# Patient Record
Sex: FEMALE | Race: WHITE | NOT HISPANIC OR LATINO | Employment: OTHER | URBAN - METROPOLITAN AREA
[De-identification: names, ages, dates, MRNs, and addresses within clinical notes are randomized per-mention and may not be internally consistent; named-entity substitution may affect disease eponyms.]

---

## 2017-01-17 ENCOUNTER — TELEPHONE (OUTPATIENT)
Dept: INTERNAL MEDICINE | Facility: CLINIC | Age: 72
End: 2017-01-17

## 2017-01-17 NOTE — TELEPHONE ENCOUNTER
Called pt to get copy of last dexa scan.  Stated her Belsomra is not working.  Sending message to Dr. Lind.

## 2017-01-18 ENCOUNTER — TELEPHONE (OUTPATIENT)
Dept: INTERNAL MEDICINE | Facility: CLINIC | Age: 72
End: 2017-01-18

## 2017-01-18 RX ORDER — ZOLPIDEM TARTRATE 10 MG/1
10 TABLET ORAL NIGHTLY PRN
Qty: 30 TABLET | Refills: 3 | Status: SHIPPED | OUTPATIENT
Start: 2017-01-18 | End: 2017-07-29 | Stop reason: SDUPTHER

## 2017-01-18 NOTE — TELEPHONE ENCOUNTER
Discontinue sleep bed.  She may use temazepam 15 mg by mouth daily at bedtime when necessary sleep

## 2017-01-18 NOTE — TELEPHONE ENCOUNTER
----- Message from Nika Chase MA sent at 1/17/2017  3:12 PM EST -----  Contact: Pt.  Patient stating that the Belsomra is not working.  It is causing her to have nightmares.  It awakens her in the middle of the night with heart palpatations.    Patient had some Zolpidem left and has been using them.    Please advise

## 2017-03-23 RX ORDER — LEVOTHYROXINE SODIUM 50 MCG
TABLET ORAL
Qty: 30 TABLET | Refills: 2 | Status: SHIPPED | OUTPATIENT
Start: 2017-03-23 | End: 2017-06-30 | Stop reason: SDUPTHER

## 2017-03-27 RX ORDER — ATORVASTATIN CALCIUM 40 MG/1
TABLET, FILM COATED ORAL
Qty: 90 TABLET | Refills: 0 | Status: SHIPPED | OUTPATIENT
Start: 2017-03-27 | End: 2017-07-03 | Stop reason: SDUPTHER

## 2017-06-29 RX ORDER — LEVOTHYROXINE SODIUM 50 MCG
TABLET ORAL
Qty: 30 TABLET | Refills: 0 | OUTPATIENT
Start: 2017-06-29

## 2017-06-30 ENCOUNTER — TELEPHONE (OUTPATIENT)
Dept: INTERNAL MEDICINE | Facility: CLINIC | Age: 72
End: 2017-06-30

## 2017-06-30 RX ORDER — LEVOTHYROXINE SODIUM 50 MCG
50 TABLET ORAL DAILY
Qty: 90 TABLET | Refills: 3 | Status: SHIPPED | OUTPATIENT
Start: 2017-06-30 | End: 2018-04-18

## 2017-06-30 RX ORDER — ATORVASTATIN CALCIUM 40 MG/1
40 TABLET, FILM COATED ORAL NIGHTLY
Qty: 90 TABLET | Refills: 0 | OUTPATIENT
Start: 2017-06-30

## 2017-06-30 NOTE — TELEPHONE ENCOUNTER
----- Message from Jami Tellez MA sent at 6/30/2017  9:42 AM EDT -----  Pt calling for refill #90 days    Synthroid 50g    [phone]

## 2017-07-03 RX ORDER — ATORVASTATIN CALCIUM 40 MG/1
TABLET, FILM COATED ORAL
Qty: 90 TABLET | Refills: 0 | Status: SHIPPED | OUTPATIENT
Start: 2017-07-03 | End: 2017-10-01 | Stop reason: SDUPTHER

## 2017-07-31 RX ORDER — ZOLPIDEM TARTRATE 10 MG/1
TABLET ORAL
Qty: 30 TABLET | Refills: 0 | Status: SHIPPED | OUTPATIENT
Start: 2017-07-31 | End: 2018-02-07 | Stop reason: SDUPTHER

## 2017-09-18 RX ORDER — DULOXETIN HYDROCHLORIDE 60 MG/1
CAPSULE, DELAYED RELEASE ORAL
Qty: 90 CAPSULE | Refills: 1 | Status: SHIPPED | OUTPATIENT
Start: 2017-09-18 | End: 2018-08-06 | Stop reason: SDUPTHER

## 2017-10-02 RX ORDER — ATORVASTATIN CALCIUM 40 MG/1
TABLET, FILM COATED ORAL
Qty: 90 TABLET | Refills: 3 | Status: SHIPPED | OUTPATIENT
Start: 2017-10-02 | End: 2018-08-27 | Stop reason: SDUPTHER

## 2017-10-05 ENCOUNTER — OFFICE VISIT (OUTPATIENT)
Dept: INTERNAL MEDICINE | Facility: CLINIC | Age: 72
End: 2017-10-05

## 2017-10-05 VITALS
WEIGHT: 135 LBS | DIASTOLIC BLOOD PRESSURE: 56 MMHG | BODY MASS INDEX: 23.92 KG/M2 | SYSTOLIC BLOOD PRESSURE: 110 MMHG | HEIGHT: 63 IN | HEART RATE: 68 BPM

## 2017-10-05 DIAGNOSIS — F41.8 MIXED ANXIETY DEPRESSIVE DISORDER: ICD-10-CM

## 2017-10-05 DIAGNOSIS — I10 BENIGN ESSENTIAL HYPERTENSION: Primary | ICD-10-CM

## 2017-10-05 DIAGNOSIS — E78.00 HYPERCHOLESTEROLEMIA: ICD-10-CM

## 2017-10-05 DIAGNOSIS — G43.009 MIGRAINE WITHOUT AURA AND WITHOUT STATUS MIGRAINOSUS, NOT INTRACTABLE: ICD-10-CM

## 2017-10-05 LAB
ALBUMIN SERPL-MCNC: 4.5 G/DL (ref 3.5–5.2)
ALBUMIN/GLOB SERPL: 1.5 G/DL
ALP SERPL-CCNC: 76 U/L (ref 39–117)
ALT SERPL W P-5'-P-CCNC: 25 U/L (ref 1–33)
ANION GAP SERPL CALCULATED.3IONS-SCNC: 9.7 MMOL/L
AST SERPL-CCNC: 23 U/L (ref 1–32)
BACTERIA UR QL AUTO: ABNORMAL /HPF
BASOPHILS # BLD AUTO: 0.01 10*3/MM3 (ref 0–0.2)
BASOPHILS NFR BLD AUTO: 0.2 % (ref 0–2)
BILIRUB SERPL-MCNC: 0.8 MG/DL (ref 0.1–1.2)
BILIRUB UR QL STRIP: NEGATIVE
BUN BLD-MCNC: 11 MG/DL (ref 8–23)
BUN/CREAT SERPL: 16.9 (ref 7–25)
CALCIUM SPEC-SCNC: 9.8 MG/DL (ref 8.6–10.5)
CHLORIDE SERPL-SCNC: 104 MMOL/L (ref 98–107)
CHOLEST SERPL-MCNC: 109 MG/DL (ref 0–200)
CLARITY UR: CLEAR
CO2 SERPL-SCNC: 27.3 MMOL/L (ref 22–29)
COLOR UR: YELLOW
CREAT BLD-MCNC: 0.65 MG/DL (ref 0.57–1)
DEPRECATED RDW RBC AUTO: 41.2 FL (ref 37–54)
EOSINOPHIL # BLD AUTO: 0.17 10*3/MM3 (ref 0–0.7)
EOSINOPHIL NFR BLD AUTO: 3.2 % (ref 0–5)
ERYTHROCYTE [DISTWIDTH] IN BLOOD BY AUTOMATED COUNT: 12.4 % (ref 11.5–15)
GFR SERPL CREATININE-BSD FRML MDRD: 90 ML/MIN/1.73
GLOBULIN UR ELPH-MCNC: 3.1 GM/DL
GLUCOSE BLD-MCNC: 101 MG/DL (ref 65–99)
GLUCOSE UR STRIP-MCNC: NEGATIVE MG/DL
HCT VFR BLD AUTO: 44.8 % (ref 34.1–44.9)
HDLC SERPL-MCNC: 39 MG/DL (ref 40–60)
HGB BLD-MCNC: 15.5 G/DL (ref 11.2–15.7)
HGB UR QL STRIP.AUTO: ABNORMAL
HYALINE CASTS UR QL AUTO: ABNORMAL /LPF
KETONES UR QL STRIP: NEGATIVE
LDLC SERPL CALC-MCNC: 48 MG/DL (ref 0–100)
LDLC/HDLC SERPL: 1.22 {RATIO}
LEUKOCYTE ESTERASE UR QL STRIP.AUTO: ABNORMAL
LYMPHOCYTES # BLD AUTO: 1.55 10*3/MM3 (ref 0.8–7)
LYMPHOCYTES NFR BLD AUTO: 29.2 % (ref 10–60)
MCH RBC QN AUTO: 32.2 PG (ref 26–34)
MCHC RBC AUTO-ENTMCNC: 34.6 G/DL (ref 31–37)
MCV RBC AUTO: 92.9 FL (ref 80–100)
MONOCYTES # BLD AUTO: 0.66 10*3/MM3 (ref 0–1)
MONOCYTES NFR BLD AUTO: 12.5 % (ref 0–13)
NEUTROPHILS # BLD AUTO: 2.91 10*3/MM3 (ref 1–11)
NEUTROPHILS NFR BLD AUTO: 54.9 % (ref 30–85)
NITRITE UR QL STRIP: NEGATIVE
PH UR STRIP.AUTO: 5.5 [PH] (ref 5–8)
PLATELET # BLD AUTO: 251 10*3/MM3 (ref 150–450)
PMV BLD AUTO: 10.1 FL (ref 6–12)
POTASSIUM BLD-SCNC: 4.5 MMOL/L (ref 3.5–5.2)
PROT SERPL-MCNC: 7.6 G/DL (ref 6–8.5)
PROT UR QL STRIP: NEGATIVE
RBC # BLD AUTO: 4.82 10*6/MM3 (ref 3.93–5.22)
RBC # UR: ABNORMAL /HPF
REF LAB TEST METHOD: ABNORMAL
SODIUM BLD-SCNC: 141 MMOL/L (ref 136–145)
SP GR UR STRIP: 1.01 (ref 1–1.03)
SQUAMOUS #/AREA URNS HPF: ABNORMAL /HPF
TRIGL SERPL-MCNC: 112 MG/DL (ref 0–150)
TSH SERPL DL<=0.05 MIU/L-ACNC: 0.01 MIU/ML (ref 0.27–4.2)
UROBILINOGEN UR QL STRIP: ABNORMAL
VLDLC SERPL-MCNC: 22.4 MG/DL (ref 5–40)
WBC NRBC COR # BLD: 5.3 10*3/MM3 (ref 5–10)
WBC UR QL AUTO: ABNORMAL /HPF

## 2017-10-05 PROCEDURE — 80061 LIPID PANEL: CPT | Performed by: INTERNAL MEDICINE

## 2017-10-05 PROCEDURE — 80053 COMPREHEN METABOLIC PANEL: CPT | Performed by: INTERNAL MEDICINE

## 2017-10-05 PROCEDURE — 84443 ASSAY THYROID STIM HORMONE: CPT | Performed by: INTERNAL MEDICINE

## 2017-10-05 PROCEDURE — 36415 COLL VENOUS BLD VENIPUNCTURE: CPT | Performed by: INTERNAL MEDICINE

## 2017-10-05 PROCEDURE — 81001 URINALYSIS AUTO W/SCOPE: CPT | Performed by: INTERNAL MEDICINE

## 2017-10-05 PROCEDURE — 99397 PER PM REEVAL EST PAT 65+ YR: CPT | Performed by: INTERNAL MEDICINE

## 2017-10-05 PROCEDURE — 85025 COMPLETE CBC W/AUTO DIFF WBC: CPT | Performed by: INTERNAL MEDICINE

## 2017-10-05 RX ORDER — THYROID 60 MG
TABLET ORAL
Refills: 0 | COMMUNITY
Start: 2017-07-05 | End: 2018-04-18

## 2017-10-05 NOTE — PROGRESS NOTES
Subjective   Naina Suarez is a 72 y.o. female.     History of Present Illness she is here today for her yearly examination which includes follow-up of chronic depression, hypercholesterolemia, osteopenia, and hypertension.  Due to a chronic sense of fatigue she was seen at a [facility] and begun on Synthroid and [place] thyroid as well as testosterone, estrogenic, and progesterone purportedly bioequivalent.  She discontinued the testosterone one month ago due to acneiform rash.  She relates being rivero as well as depressed and somewhat forgetful for quite some time.  This relates primarily to the separation she has undergone from her  on a mutual basis approximately 3 or 4 years ago.  There is still good friends and communicate with each other but no longer cohabitate.  She also is under chronic stress in that she cares for her 91-year-old mother who lives with her.  Although her mother's health is generally good at does add certain psychological concerns of course.  She has occasional headache but nothing severe or prolonged.  She denies any dizziness or focal neurologic symptoms.  She does not exercise regularly.  She denies any PND, SAUCEDA, chest pain, or swelling in the ankles.  In fact her review systems is otherwise negative.  She will be seeing her gynecologist soon and a mammogram will be scheduled.  She had a normal colonoscopy in 2014.        Review of Systems   Constitutional: Positive for fatigue. Negative for activity change and appetite change.   Respiratory: Negative for cough, chest tightness, shortness of breath and wheezing.    Cardiovascular: Positive for palpitations. Negative for chest pain and leg swelling.   Gastrointestinal: Negative for abdominal pain, anal bleeding, blood in stool, constipation, diarrhea, nausea and vomiting.   Genitourinary: Negative for difficulty urinating, dysuria, flank pain, frequency, hematuria and vaginal bleeding.   Musculoskeletal: Positive for back  pain. Negative for arthralgias and gait problem.   Neurological: Positive for headaches. Negative for dizziness, syncope, facial asymmetry, speech difficulty, weakness and numbness.   Psychiatric/Behavioral: Negative for dysphoric mood. The patient is nervous/anxious.        Objective   Physical Exam   Constitutional: She is oriented to person, place, and time. Vital signs are normal. She appears well-developed and well-nourished. She is active. She does not appear ill.   HENT:   Right Ear: Tympanic membrane, external ear and ear canal normal.   Left Ear: Tympanic membrane, external ear and ear canal normal.   Mouth/Throat: No oral lesions.   Eyes: Conjunctivae are normal.   Fundoscopic exam:       The right eye shows no AV nicking, no exudate and no hemorrhage.        The left eye shows no AV nicking, no exudate and no hemorrhage.   Neck: Carotid bruit is not present. No thyroid mass and no thyromegaly present.   Cardiovascular: Normal rate, regular rhythm, S1 normal and S2 normal.  Exam reveals no S3 and no S4.    No murmur heard.  Pulses:       Posterior tibial pulses are 2+ on the right side, and 2+ on the left side.   Pulmonary/Chest: No tachypnea. No respiratory distress. She has no decreased breath sounds. She has no wheezes. She has no rhonchi. She has no rales.   Abdominal: Soft. Normal appearance and bowel sounds are normal. She exhibits no abdominal bruit and no mass. There is no hepatosplenomegaly. There is no tenderness.   Musculoskeletal:        Arms:      Vascular Status -  Her exam exhibits no right foot edema. Her exam exhibits no left foot edema.  Lymphadenopathy:     She has no cervical adenopathy.   Neurological: She is alert and oriented to person, place, and time. She has normal strength. She displays no tremor. She displays a negative Romberg sign. Gait normal.   Reflex Scores:       Bicep reflexes are 2+ on the right side.  Psychiatric: She has a normal mood and affect. Her speech is normal  and behavior is normal. Judgment and thought content normal. Cognition and memory are normal.       Assessment/Plan DEXA scan last year was 0.0 at the LS-spine and -0.1 and -0.2 in the hips    Assessment #1 hypertension-good control #2 hypercholesterolemia-generally well-controlled and without sign of target organ injury #3 osteopenia-stable #4 chronic anxiety and depression-fairly well compensated and certainly her marital separation in the care of her mother are causative.  This was discussed with her also I did discuss with her the risk of using estrogen-containing on a long-term basis regarding breast cancer risk as well as cardiovascular disease and thromboembolic phenomena.    Plan #1 no change medication #2 I have recommended that she use her stationary bike for 30 minutes daily #3 mammogram to be scheduled by her gynecologist #4 CBC, CMP, urinalysis, TSH, lipids.  Routine follow-up with me in one year.

## 2017-10-09 ENCOUNTER — TELEPHONE (OUTPATIENT)
Dept: INTERNAL MEDICINE | Facility: CLINIC | Age: 72
End: 2017-10-09

## 2017-10-09 NOTE — TELEPHONE ENCOUNTER
----- Message from Jose Alfredo Lind Jr., MD sent at 10/6/2017  7:53 AM EDT -----  LDL cholesterol is very good at 48.  However thyroid is very high.  I would discontinue both armour thyroid and Synthroid with repeat TSH in 3 months.

## 2017-11-01 RX ORDER — NADOLOL 80 MG/1
TABLET ORAL
Qty: 90 TABLET | Refills: 3 | Status: SHIPPED | OUTPATIENT
Start: 2017-11-01 | End: 2018-10-17 | Stop reason: SDUPTHER

## 2018-02-07 RX ORDER — ZOLPIDEM TARTRATE 10 MG/1
TABLET ORAL
Qty: 30 TABLET | Refills: 0 | Status: SHIPPED | OUTPATIENT
Start: 2018-02-10 | End: 2018-05-14 | Stop reason: SDUPTHER

## 2018-04-02 RX ORDER — DULOXETIN HYDROCHLORIDE 60 MG/1
60 CAPSULE, DELAYED RELEASE ORAL DAILY
Qty: 90 CAPSULE | Refills: 0 | Status: SHIPPED | OUTPATIENT
Start: 2018-04-02 | End: 2018-04-18 | Stop reason: SDUPTHER

## 2018-04-18 ENCOUNTER — OFFICE VISIT (OUTPATIENT)
Dept: INTERNAL MEDICINE | Facility: CLINIC | Age: 73
End: 2018-04-18

## 2018-04-18 VITALS
WEIGHT: 141 LBS | SYSTOLIC BLOOD PRESSURE: 100 MMHG | OXYGEN SATURATION: 98 % | HEIGHT: 63 IN | DIASTOLIC BLOOD PRESSURE: 60 MMHG | BODY MASS INDEX: 24.98 KG/M2 | HEART RATE: 67 BPM

## 2018-04-18 DIAGNOSIS — E05.90 HYPERTHYROIDISM: Primary | ICD-10-CM

## 2018-04-18 LAB
T4 FREE SERPL-MCNC: 0.86 NG/DL (ref 0.93–1.7)
TSH SERPL-ACNC: 3.64 MIU/ML (ref 0.27–4.2)

## 2018-04-18 PROCEDURE — 99213 OFFICE O/P EST LOW 20 MIN: CPT | Performed by: INTERNAL MEDICINE

## 2018-04-18 RX ORDER — ANTIARTHRITIC COMBINATION NO.2 900 MG
TABLET ORAL DAILY
COMMUNITY

## 2018-04-18 NOTE — PROGRESS NOTES
Subjective   Naina Suarez is a 72 y.o. female.     History of Present Illness she is here today for follow-up of excessive thyroid hormone supplementation.  She was started on [place] 60 mg and Synthroid 0.05 mg by the [facility] during last summer.  When seen for routine yearly physical exam in October of this year she was found to have a very low TSH.  Both thyroid supplements were discontinued.  She has gained some weight of course since that time and her energy level she feels has diminished significantly.  She has chronic insomnia due to stress factors regarding the health of her mother who is in her 90s and actually doing well and the fact that she has been  from her  for the last 4 or 5 years.  She only gets 3-5 hours of sleep per night.        Review of Systems   Constitutional: Positive for fatigue and unexpected weight gain. Negative for activity change and appetite change.   Respiratory: Negative for chest tightness and shortness of breath.    Gastrointestinal: Negative for abdominal pain and constipation.   Neurological: Negative for dizziness, weakness and headaches.   Psychiatric/Behavioral: The patient is nervous/anxious.        Objective   Physical Exam   Constitutional: She is oriented to person, place, and time. She appears well-developed and well-nourished. She is active. She does not appear ill.   Neck: No thyroid mass and no thyromegaly present.   Cardiovascular: Normal rate, regular rhythm, S1 normal and S2 normal.  Exam reveals no S3 and no S4.    No murmur heard.  Pulmonary/Chest: No tachypnea. No respiratory distress. She has no decreased breath sounds. She has no wheezes. She has no rhonchi. She has no rales.   Abdominal: Soft. Normal appearance and bowel sounds are normal. She exhibits no abdominal bruit and no mass. There is no hepatosplenomegaly. There is no tenderness.     Vascular Status -  Her right foot exhibits no edema. Her left foot exhibits no  edema.  Neurological: She is alert and oriented to person, place, and time. Gait normal.   Reflex Scores:       Bicep reflexes are 2+ on the right side.  Psychiatric: She has a normal mood and affect. Her speech is normal and behavior is normal. Judgment and thought content normal. Cognition and memory are normal.         Assessment/Plan October lab showed a normal CBC, CMP.  Total cholesterol 109 triglycerides 112 HDL cholesterol 39 LDL cholesterol 48.  TSH 0.006    Assessment #1 hyperthyroidism-secondary to inappropriate thyroid supplementation which has been discontinued.  Again this was discussed with her.    Plan #1 TSH, free T3, free T4 today.  Routine follow-up with me in 3 months.

## 2018-04-19 ENCOUNTER — TELEPHONE (OUTPATIENT)
Dept: INTERNAL MEDICINE | Facility: CLINIC | Age: 73
End: 2018-04-19

## 2018-04-19 LAB — T3FREE SERPL-MCNC: 3.1 PG/ML (ref 2–4.4)

## 2018-04-19 RX ORDER — LEVOTHYROXINE SODIUM 0.05 MG/1
50 TABLET ORAL DAILY
Qty: 90 TABLET | Refills: 3 | Status: SHIPPED | OUTPATIENT
Start: 2018-04-19 | End: 2018-10-17 | Stop reason: SDUPTHER

## 2018-04-19 NOTE — TELEPHONE ENCOUNTER
----- Message from Jose Alfredo Lind Jr., MD sent at 4/19/2018  9:08 AM EDT -----  Begin Synthroid 0.05 mg 1 by mouth daily.  Follow-up with me in 3 months.

## 2018-05-11 RX ORDER — ZOLPIDEM TARTRATE 10 MG/1
TABLET ORAL
Qty: 30 TABLET | Refills: 0 | Status: CANCELLED | OUTPATIENT
Start: 2018-05-11

## 2018-05-14 RX ORDER — ZOLPIDEM TARTRATE 10 MG/1
10 TABLET ORAL NIGHTLY PRN
Qty: 30 TABLET | Refills: 0 | Status: SHIPPED | OUTPATIENT
Start: 2018-05-14 | End: 2018-10-17 | Stop reason: SDUPTHER

## 2018-05-14 NOTE — TELEPHONE ENCOUNTER
----- Message from Azalia Brown sent at 5/11/2018  2:32 PM EDT -----  Contact: Patient  Patient requesting refill on    zolpidem (AMBIEN) 10 MG tablet    Patient:[phone]    Pharmacy:[facility] [address] AT [facility] - [phone] [facility] [phone]

## 2018-05-14 NOTE — TELEPHONE ENCOUNTER
Called into patients [facility] pharmacy quantity of 30 with 5 refills per Dr Lind.

## 2018-06-28 RX ORDER — DULOXETIN HYDROCHLORIDE 60 MG/1
60 CAPSULE, DELAYED RELEASE ORAL DAILY
Qty: 90 CAPSULE | Refills: 0 | Status: SHIPPED | OUTPATIENT
Start: 2018-06-28 | End: 2018-10-17 | Stop reason: SDUPTHER

## 2018-08-06 ENCOUNTER — OFFICE VISIT (OUTPATIENT)
Dept: INTERNAL MEDICINE | Facility: CLINIC | Age: 73
End: 2018-08-06

## 2018-08-06 VITALS
SYSTOLIC BLOOD PRESSURE: 118 MMHG | WEIGHT: 139 LBS | BODY MASS INDEX: 24.63 KG/M2 | DIASTOLIC BLOOD PRESSURE: 84 MMHG | HEIGHT: 63 IN

## 2018-08-06 DIAGNOSIS — F41.8 MIXED ANXIETY DEPRESSIVE DISORDER: ICD-10-CM

## 2018-08-06 DIAGNOSIS — I10 BENIGN ESSENTIAL HYPERTENSION: ICD-10-CM

## 2018-08-06 DIAGNOSIS — E03.9 ACQUIRED HYPOTHYROIDISM: Primary | ICD-10-CM

## 2018-08-06 LAB — T4 FREE SERPL-MCNC: 1.11 NG/DL (ref 0.93–1.7)

## 2018-08-06 PROCEDURE — 99213 OFFICE O/P EST LOW 20 MIN: CPT | Performed by: INTERNAL MEDICINE

## 2018-08-06 NOTE — PROGRESS NOTES
Subjective   Naina Suarez is a 73 y.o. female.     History of Present Illness she is here today for follow-up of hypothyroidism.  She continues to have fatigue despite thyroid supplementation.  Her sleep is broken and she only gets to bed at 3 or 4 in the morning because she is spending time with her mother at her mother's residence prior to returning home.  She gets perhaps 4 or 5 hours sleep and then her mother call surgery date each morning.  She may be able to get back to sleep for 3 or 4 hours after that.        Review of Systems   Constitutional: Positive for fatigue. Negative for activity change, appetite change and unexpected weight gain.   HENT: Negative for trouble swallowing.    Respiratory: Negative for shortness of breath.    Cardiovascular: Negative for chest pain and leg swelling.   Gastrointestinal: Negative for abdominal pain and constipation.   Neurological: Negative for dizziness, weakness and headache.       Objective   Physical Exam   Constitutional: She is oriented to person, place, and time. Vital signs are normal. She appears well-developed and well-nourished. She is active. She does not appear ill.   Eyes: Conjunctivae are normal.   Neck: No thyromegaly present.   Cardiovascular: Normal rate, regular rhythm, S1 normal and S2 normal.  Exam reveals no S3 and no S4.    No murmur heard.  Pulmonary/Chest: No tachypnea. No respiratory distress. She has no decreased breath sounds. She has no wheezes. She has no rhonchi. She has no rales.   Abdominal: Soft. Normal appearance and bowel sounds are normal. She exhibits no abdominal bruit and no mass. There is no hepatosplenomegaly. There is no tenderness.     Vascular Status -  Her right foot exhibits no edema. Her left foot exhibits no edema.  Neurological: She is alert and oriented to person, place, and time. Gait normal.   Reflex Scores:       Bicep reflexes are 2+ on the right side.  Psychiatric: She has a normal mood and affect. Her speech is  normal and behavior is normal. Judgment and thought content normal. Cognition and memory are normal.         Assessment/Plan April lab showed normal free T3 and TSH.  Free T4 was 0.86    Assessment #1 hypothyroidism-should be euthyroid at this point.  Fatigue likely due to interrupted sleep and ongoing stress in caring for her mother.  This was discussed with her.    Plan #1 no change medication #2 free T4 today.  Routine follow-up in 6 months.

## 2018-08-28 RX ORDER — ATORVASTATIN CALCIUM 40 MG/1
TABLET, FILM COATED ORAL
Qty: 90 TABLET | Refills: 0 | Status: SHIPPED | OUTPATIENT
Start: 2018-08-28 | End: 2018-10-17 | Stop reason: SDUPTHER

## 2018-10-17 RX ORDER — LEVOTHYROXINE SODIUM 0.05 MG/1
50 TABLET ORAL DAILY
Qty: 30 TABLET | Refills: 0 | Status: SHIPPED | OUTPATIENT
Start: 2018-10-17 | End: 2018-10-17 | Stop reason: SDUPTHER

## 2018-10-17 RX ORDER — DULOXETIN HYDROCHLORIDE 60 MG/1
60 CAPSULE, DELAYED RELEASE ORAL DAILY
Qty: 90 CAPSULE | Refills: 0 | Status: SHIPPED | OUTPATIENT
Start: 2018-10-17 | End: 2019-01-25 | Stop reason: SDUPTHER

## 2018-10-17 RX ORDER — LEVOTHYROXINE SODIUM 0.05 MG/1
50 TABLET ORAL DAILY
Qty: 90 TABLET | Refills: 0 | Status: SHIPPED | OUTPATIENT
Start: 2018-10-17 | End: 2019-01-25 | Stop reason: SDUPTHER

## 2018-10-17 RX ORDER — ATORVASTATIN CALCIUM 40 MG/1
40 TABLET, FILM COATED ORAL
Qty: 90 TABLET | Refills: 0 | Status: SHIPPED | OUTPATIENT
Start: 2018-10-17 | End: 2018-12-05 | Stop reason: SDUPTHER

## 2018-10-17 RX ORDER — NADOLOL 80 MG/1
80 TABLET ORAL DAILY
Qty: 30 TABLET | Refills: 0 | Status: SHIPPED | OUTPATIENT
Start: 2018-10-17 | End: 2018-10-17 | Stop reason: SDUPTHER

## 2018-10-17 RX ORDER — NADOLOL 80 MG/1
80 TABLET ORAL DAILY
Qty: 90 TABLET | Refills: 0 | Status: SHIPPED | OUTPATIENT
Start: 2018-10-17 | End: 2019-01-25 | Stop reason: SDUPTHER

## 2018-10-17 RX ORDER — ZOLPIDEM TARTRATE 10 MG/1
10 TABLET ORAL NIGHTLY PRN
Qty: 90 TABLET | Refills: 0 | OUTPATIENT
Start: 2018-10-17 | End: 2019-01-25 | Stop reason: SDUPTHER

## 2018-10-17 RX ORDER — ATORVASTATIN CALCIUM 40 MG/1
40 TABLET, FILM COATED ORAL
Qty: 30 TABLET | Refills: 0 | Status: SHIPPED | OUTPATIENT
Start: 2018-10-17 | End: 2018-10-17 | Stop reason: SDUPTHER

## 2018-10-17 NOTE — TELEPHONE ENCOUNTER
----- Message from Adriane Gomezjorge sent at 10/17/2018 12:49 PM EDT -----  Contact: Pt-Valdo juarez MD.  Pt is calling for a refill     FOR  zolpidem (AMBIEN) 10 MG tablet  atorvastatin (LIPITOR) 40 MG tablet  levothyroxine (SYNTHROID) 50 MCG tablet  nadolol (CORGARD) 80 MG tablet    Patient requests RX SENT TO    [facility] [id number] [place] [address] AT [facility] - [phone] [place] [phone] FX      Caller# [phone]     Please and thank you.

## 2018-12-05 RX ORDER — ATORVASTATIN CALCIUM 40 MG/1
40 TABLET, FILM COATED ORAL
Qty: 30 TABLET | Refills: 0 | Status: SHIPPED | OUTPATIENT
Start: 2018-12-05 | End: 2019-01-25 | Stop reason: SDUPTHER

## 2019-01-25 ENCOUNTER — OFFICE VISIT (OUTPATIENT)
Dept: FAMILY MEDICINE CLINIC | Facility: CLINIC | Age: 74
End: 2019-01-25

## 2019-01-25 VITALS
SYSTOLIC BLOOD PRESSURE: 116 MMHG | HEART RATE: 64 BPM | RESPIRATION RATE: 13 BRPM | WEIGHT: 120 LBS | HEIGHT: 62 IN | DIASTOLIC BLOOD PRESSURE: 84 MMHG | BODY MASS INDEX: 22.08 KG/M2 | OXYGEN SATURATION: 98 %

## 2019-01-25 DIAGNOSIS — G47.00 INSOMNIA, UNSPECIFIED TYPE: ICD-10-CM

## 2019-01-25 DIAGNOSIS — F41.9 ANXIETY: Primary | ICD-10-CM

## 2019-01-25 DIAGNOSIS — M47.816 OSTEOARTHRITIS OF LUMBAR SPINE, UNSPECIFIED SPINAL OSTEOARTHRITIS COMPLICATION STATUS: ICD-10-CM

## 2019-01-25 DIAGNOSIS — E78.5 HYPERLIPIDEMIA, UNSPECIFIED HYPERLIPIDEMIA TYPE: ICD-10-CM

## 2019-01-25 DIAGNOSIS — E03.9 HYPOTHYROIDISM, UNSPECIFIED TYPE: ICD-10-CM

## 2019-01-25 DIAGNOSIS — R00.2 PALPITATIONS: ICD-10-CM

## 2019-01-25 DIAGNOSIS — I10 ESSENTIAL HYPERTENSION: ICD-10-CM

## 2019-01-25 DIAGNOSIS — Z86.79 HISTORY OF VALVULAR HEART DISEASE: ICD-10-CM

## 2019-01-25 PROCEDURE — 99204 OFFICE O/P NEW MOD 45 MIN: CPT | Performed by: FAMILY MEDICINE

## 2019-01-25 RX ORDER — ZOLPIDEM TARTRATE 10 MG/1
10 TABLET ORAL NIGHTLY PRN
Qty: 30 TABLET | Refills: 0 | Status: SHIPPED | OUTPATIENT
Start: 2019-01-25 | End: 2019-06-09 | Stop reason: SDUPTHER

## 2019-01-25 RX ORDER — DULOXETIN HYDROCHLORIDE 60 MG/1
60 CAPSULE, DELAYED RELEASE ORAL DAILY
Qty: 90 CAPSULE | Refills: 1 | Status: SHIPPED | OUTPATIENT
Start: 2019-01-25 | End: 2019-08-22 | Stop reason: SDUPTHER

## 2019-01-25 RX ORDER — ATORVASTATIN CALCIUM 40 MG/1
40 TABLET, FILM COATED ORAL
Qty: 90 TABLET | Refills: 1 | Status: SHIPPED | OUTPATIENT
Start: 2019-01-25 | End: 2021-12-13

## 2019-01-25 RX ORDER — LEVOTHYROXINE SODIUM 0.05 MG/1
50 TABLET ORAL DAILY
Qty: 90 TABLET | Refills: 1 | Status: SHIPPED | OUTPATIENT
Start: 2019-01-25

## 2019-01-25 RX ORDER — NADOLOL 80 MG/1
80 TABLET ORAL DAILY
Qty: 90 TABLET | Refills: 1 | Status: SHIPPED | OUTPATIENT
Start: 2019-01-25 | End: 2021-12-13

## 2019-01-25 RX ORDER — BUSPIRONE HYDROCHLORIDE 5 MG/1
5 TABLET ORAL 3 TIMES DAILY PRN
Qty: 90 TABLET | Refills: 3 | Status: SHIPPED | OUTPATIENT
Start: 2019-01-25 | End: 2019-02-20 | Stop reason: HOSPADM

## 2019-01-25 NOTE — PROGRESS NOTES
Subjective   Naina Suarez is a 73 y.o. female.     Chief Complaint   Patient presents with   • Insomnia   • Hypertension   • Establish Care       HPI     Insomnia:  -taking zolpidem 5-10 mg QHS and she is tolerating it well  -no next day grogginess  -no sleep walking or sleep talking  -she is experiencing caregiver stress: her elderly mother is in hospice care for CHF and CKD   -she is taking duloxetine 60 mg daily (initially prescribed for back pain due to DJD) and xanax prn panic attacks left over from 9 yr ago  - from her  x 9 yr  -significant stress in her relationship with her boyfriend of 3 yr  -no SI, HI, or self harm    HTN:  -taking nadolol 80 mg daily  -no chest pain, dizziness, headaches, or acute vision changes  -intermittent palpitations, which pt has attributed to anxiety  -no syncope  -pt also reports a hx of valvular heart disease    Thyroid disease:  -taking levothyroxine 50 mcg daily    Hyperlipidemia:  -taking atorvastatin 40 mg daily    Previous PCP, Dr. Lind, has retired         Review of Systems   Constitutional: Positive for unexpected weight change (20 lb weight loss in the last year). Negative for fever.   HENT: Negative for congestion and sore throat.    Eyes: Negative for pain and visual disturbance.   Respiratory: Negative for cough and shortness of breath.    Cardiovascular: Positive for palpitations. Negative for chest pain.   Gastrointestinal: Negative for abdominal pain and nausea.   Genitourinary: Negative for dysuria and hematuria.   Musculoskeletal: Negative for arthralgias and gait problem.   Skin: Negative for rash and wound.   Allergic/Immunologic: Negative for food allergies and immunocompromised state.   Neurological: Negative for dizziness, syncope and headaches.   Psychiatric/Behavioral: Positive for dysphoric mood and sleep disturbance. Negative for self-injury and suicidal ideas. The patient is nervous/anxious.        The following portions of the  patient's history were reviewed and updated as appropriate: allergies, current medications, past family history, past medical history, past social history, past surgical history and problem list.    Past Medical History:   Diagnosis Date   • Caregiver stress    • Degenerative joint disease (DJD) of lumbar spine    • Degenerative joint disease (DJD) of lumbar spine    • Hyperlipidemia    • Hypertension    • Hyperthyroidism    • Insomnia    • Migraine    • Osteopenia    • Valvular heart disease      Past Surgical History:   Procedure Laterality Date   • CATARACT EXTRACTION Right    • TOE SURGERY     • WISDOM TOOTH EXTRACTION       Family History   Problem Relation Age of Onset   • Heart failure Mother    • Cataracts Mother    • Heart attack Father    • Lung disease Father    • Retinal detachment Sister    • Anxiety disorder Sister    • Bipolar disorder Sister    • Obesity Sister    • Fibromyalgia Sister    • Heart murmur Sister      Social History     Tobacco Use   • Smoking status: Never Smoker   • Smokeless tobacco: Never Used   Substance and Sexual Activity   • Alcohol use: Yes     Comment: Rarely   • Drug use: No     Allergies   Allergen Reactions   • Sulfa Antibiotics Hives and Itching        Outpatient Medications Prior to Visit   Medication Sig Dispense Refill   • Biotin 5000 MCG tablet Take  by mouth Daily.     • Calcium Carbonate-Vit D-Min (CALCIUM 1200 PO) Take  by mouth Daily.     • Cholecalciferol (VITAMIN D3) 5000 units capsule capsule Take 5,000 Units by mouth Daily.     • Cyanocobalamin (B-12 PO) Take  by mouth Daily.     • Multiple Vitamins-Minerals (WOMENS MULTIVITAMIN PO) Take  by mouth Daily.     • atorvastatin (LIPITOR) 40 MG tablet Take 1 tablet by mouth every night at bedtime. 30 tablet 0   • DULoxetine (CYMBALTA) 60 MG capsule Take 1 capsule by mouth Daily. 90 capsule 0   • levothyroxine (SYNTHROID) 50 MCG tablet Take 1 tablet by mouth Daily. 90 tablet 0   • nadolol (CORGARD) 80 MG tablet Take 1  tablet by mouth Daily. 90 tablet 0   • zolpidem (AMBIEN) 10 MG tablet Take 1 tablet by mouth At Night As Needed (When necessary). 90 tablet 0     Objective     Vitals:    01/25/19 1535   BP: 116/84   Pulse: 64   Resp: 13   SpO2: 98%       Physical Exam   Constitutional: She appears well-developed and well-nourished. No distress.   HENT:   Head: Normocephalic and atraumatic.   Neck: No thyromegaly present.   Cardiovascular: Normal rate and regular rhythm. Exam reveals no gallop and no friction rub.   Murmur heard.   Systolic murmur is present with a grade of 3/6.  Pulses:       Radial pulses are 2+ on the right side, and 2+ on the left side.   Pulmonary/Chest: Effort normal and breath sounds normal. No respiratory distress. She has no wheezes. She has no rhonchi. She has no rales.   Abdominal: Soft. Bowel sounds are normal. She exhibits no distension. There is no tenderness.   Lymphadenopathy:     She has no cervical adenopathy.   Skin: Skin is warm and dry.   Psychiatric: Her speech is normal. Her mood appears anxious. She exhibits a depressed mood.   tearful       ASSESSMENT/PLAN       Problem List Items Addressed This Visit        Musculoskeletal and Integument    Degenerative joint disease (DJD) of lumbar spine    Relevant Medications    Refill DULoxetine (CYMBALTA) 60 MG capsule       Other    Insomnia    Relevant Medications    Refill zolpidem (AMBIEN) 10 MG tablet  MANDEEP reviewed and consistent with reported history.   Controlled substance contract completed and signed.         Other Visit Diagnoses     Anxiety    -  Primary    Relevant Medications    Start busPIRone (BUSPAR) 5 MG tablet TID prn; pt cautioned about risk for serotonin syndrome    Continue DULoxetine (CYMBALTA) 60 MG capsule    Other Relevant Orders    Ambulatory Referral to Behavioral Health    Essential hypertension        Relevant Medications    Refill nadolol (CORGARD) 80 MG tablet    Hyperlipidemia, unspecified hyperlipidemia type         Relevant Medications    Refill atorvastatin (LIPITOR) 40 MG tablet    Hypothyroidism, unspecified type        Relevant Medications        Refill levothyroxine (SYNTHROID) 50 MCG tablet    Palpitations    & mumur in a pt with a History of valvular heart disease  But NO chest pain, SOA, or syncope      Relevant Orders    Ambulatory Referral to Cardiology              Patient Instructions   Please schedule fasting labs within the next 2 months    Return in about 2 months (around 3/25/2019).      Roseyln Cantrell MD  01/25/19

## 2019-01-29 DIAGNOSIS — E03.9 HYPOTHYROIDISM, UNSPECIFIED TYPE: Primary | ICD-10-CM

## 2019-01-29 DIAGNOSIS — Z13.1 SCREENING FOR DIABETES MELLITUS: ICD-10-CM

## 2019-01-29 DIAGNOSIS — E78.5 HYPERLIPIDEMIA, UNSPECIFIED HYPERLIPIDEMIA TYPE: ICD-10-CM

## 2019-01-29 DIAGNOSIS — R53.83 FATIGUE, UNSPECIFIED TYPE: ICD-10-CM

## 2019-01-30 ENCOUNTER — RESULTS ENCOUNTER (OUTPATIENT)
Dept: FAMILY MEDICINE CLINIC | Facility: CLINIC | Age: 74
End: 2019-01-30

## 2019-01-30 DIAGNOSIS — Z13.1 SCREENING FOR DIABETES MELLITUS: ICD-10-CM

## 2019-01-30 DIAGNOSIS — E03.9 HYPOTHYROIDISM, UNSPECIFIED TYPE: ICD-10-CM

## 2019-01-30 DIAGNOSIS — E78.5 HYPERLIPIDEMIA, UNSPECIFIED HYPERLIPIDEMIA TYPE: ICD-10-CM

## 2019-01-30 DIAGNOSIS — R53.83 FATIGUE, UNSPECIFIED TYPE: ICD-10-CM

## 2019-02-20 ENCOUNTER — OFFICE VISIT (OUTPATIENT)
Dept: FAMILY MEDICINE CLINIC | Facility: CLINIC | Age: 74
End: 2019-02-20

## 2019-02-20 ENCOUNTER — HOSPITAL ENCOUNTER (EMERGENCY)
Facility: HOSPITAL | Age: 74
Discharge: HOME | End: 2019-02-20
Attending: EMERGENCY MEDICINE | Admitting: EMERGENCY MEDICINE

## 2019-02-20 VITALS
WEIGHT: 119.4 LBS | RESPIRATION RATE: 18 BRPM | OXYGEN SATURATION: 98 % | SYSTOLIC BLOOD PRESSURE: 116 MMHG | DIASTOLIC BLOOD PRESSURE: 71 MMHG | TEMPERATURE: 97.7 F | HEIGHT: 64 IN | BODY MASS INDEX: 20.38 KG/M2 | HEART RATE: 85 BPM

## 2019-02-20 VITALS
OXYGEN SATURATION: 98 % | WEIGHT: 114 LBS | HEART RATE: 108 BPM | SYSTOLIC BLOOD PRESSURE: 128 MMHG | DIASTOLIC BLOOD PRESSURE: 88 MMHG | HEIGHT: 62 IN | BODY MASS INDEX: 20.98 KG/M2

## 2019-02-20 DIAGNOSIS — F32.A DEPRESSION, UNSPECIFIED DEPRESSION TYPE: Primary | ICD-10-CM

## 2019-02-20 DIAGNOSIS — E87.6 LOW SERUM POTASSIUM: ICD-10-CM

## 2019-02-20 DIAGNOSIS — E86.0 DEHYDRATION: ICD-10-CM

## 2019-02-20 LAB
ALBUMIN SERPL-MCNC: 4.1 G/DL (ref 3.5–5.2)
ALBUMIN/GLOB SERPL: 1.3 G/DL
ALP SERPL-CCNC: 73 U/L (ref 39–117)
ALT SERPL W P-5'-P-CCNC: 25 U/L (ref 1–33)
AMPHET+METHAMPHET UR QL: NEGATIVE
ANION GAP SERPL CALCULATED.3IONS-SCNC: 13.4 MMOL/L
AST SERPL-CCNC: 30 U/L (ref 1–32)
BACTERIA UR QL AUTO: NORMAL /HPF
BARBITURATES UR QL SCN: NEGATIVE
BASOPHILS # BLD AUTO: 0.04 10*3/MM3 (ref 0–0.2)
BASOPHILS NFR BLD AUTO: 0.9 % (ref 0–1.5)
BENZODIAZ UR QL SCN: NEGATIVE
BILIRUB SERPL-MCNC: 0.9 MG/DL (ref 0.1–1.2)
BILIRUB UR QL STRIP: NEGATIVE
BUN BLD-MCNC: 9 MG/DL (ref 8–23)
BUN/CREAT SERPL: 14.1 (ref 7–25)
CALCIUM SPEC-SCNC: 10 MG/DL (ref 8.6–10.5)
CANNABINOIDS SERPL QL: NEGATIVE
CHLORIDE SERPL-SCNC: 104 MMOL/L (ref 98–107)
CLARITY UR: CLEAR
CO2 SERPL-SCNC: 24.6 MMOL/L (ref 22–29)
COCAINE UR QL: NEGATIVE
COLOR UR: YELLOW
CREAT BLD-MCNC: 0.64 MG/DL (ref 0.57–1)
DEPRECATED RDW RBC AUTO: 44.8 FL (ref 37–54)
EOSINOPHIL # BLD AUTO: 0.09 10*3/MM3 (ref 0–0.4)
EOSINOPHIL NFR BLD AUTO: 1.9 % (ref 0.3–6.2)
ERYTHROCYTE [DISTWIDTH] IN BLOOD BY AUTOMATED COUNT: 12.3 % (ref 12.3–15.4)
ETHANOL BLD-MCNC: <10 MG/DL (ref 0–10)
ETHANOL UR QL: <0.01 %
GFR SERPL CREATININE-BSD FRML MDRD: 91 ML/MIN/1.73
GLOBULIN UR ELPH-MCNC: 3.1 GM/DL
GLUCOSE BLD-MCNC: 143 MG/DL (ref 65–99)
GLUCOSE UR STRIP-MCNC: NEGATIVE MG/DL
HCT VFR BLD AUTO: 43.4 % (ref 34–46.6)
HGB BLD-MCNC: 14.6 G/DL (ref 12–15.9)
HGB UR QL STRIP.AUTO: NEGATIVE
HOLD SPECIMEN: NORMAL
HOLD SPECIMEN: NORMAL
HYALINE CASTS UR QL AUTO: NORMAL /LPF
IMM GRANULOCYTES # BLD AUTO: 0.01 10*3/MM3 (ref 0–0.05)
IMM GRANULOCYTES NFR BLD AUTO: 0.2 % (ref 0–0.5)
KETONES UR QL STRIP: NEGATIVE
LEUKOCYTE ESTERASE UR QL STRIP.AUTO: ABNORMAL
LIPASE SERPL-CCNC: 38 U/L (ref 13–60)
LYMPHOCYTES # BLD AUTO: 1.56 10*3/MM3 (ref 0.7–3.1)
LYMPHOCYTES NFR BLD AUTO: 33.7 % (ref 19.6–45.3)
MCH RBC QN AUTO: 33 PG (ref 26.6–33)
MCHC RBC AUTO-ENTMCNC: 33.6 G/DL (ref 31.5–35.7)
MCV RBC AUTO: 98.2 FL (ref 79–97)
METHADONE UR QL SCN: NEGATIVE
MONOCYTES # BLD AUTO: 0.37 10*3/MM3 (ref 0.1–0.9)
MONOCYTES NFR BLD AUTO: 8 % (ref 5–12)
NEUTROPHILS # BLD AUTO: 2.56 10*3/MM3 (ref 1.4–7)
NEUTROPHILS NFR BLD AUTO: 55.3 % (ref 42.7–76)
NITRITE UR QL STRIP: NEGATIVE
NRBC BLD AUTO-RTO: 0 /100 WBC (ref 0–0)
OPIATES UR QL: NEGATIVE
OXYCODONE UR QL SCN: NEGATIVE
PH UR STRIP.AUTO: 6.5 [PH] (ref 5–8)
PLATELET # BLD AUTO: 238 10*3/MM3 (ref 140–450)
PMV BLD AUTO: 9.9 FL (ref 6–12)
POTASSIUM BLD-SCNC: 3.2 MMOL/L (ref 3.5–5.2)
PROT SERPL-MCNC: 7.2 G/DL (ref 6–8.5)
PROT UR QL STRIP: NEGATIVE
RBC # BLD AUTO: 4.42 10*6/MM3 (ref 3.77–5.28)
RBC # UR: NORMAL /HPF
REF LAB TEST METHOD: NORMAL
SODIUM BLD-SCNC: 142 MMOL/L (ref 136–145)
SP GR UR STRIP: 1.02 (ref 1–1.03)
SQUAMOUS #/AREA URNS HPF: NORMAL /HPF
UROBILINOGEN UR QL STRIP: ABNORMAL
WBC NRBC COR # BLD: 4.63 10*3/MM3 (ref 3.4–10.8)
WBC UR QL AUTO: NORMAL /HPF
WHOLE BLOOD HOLD SPECIMEN: NORMAL
WHOLE BLOOD HOLD SPECIMEN: NORMAL

## 2019-02-20 PROCEDURE — 80307 DRUG TEST PRSMV CHEM ANLYZR: CPT | Performed by: PHYSICIAN ASSISTANT

## 2019-02-20 PROCEDURE — 81001 URINALYSIS AUTO W/SCOPE: CPT | Performed by: PHYSICIAN ASSISTANT

## 2019-02-20 PROCEDURE — 85025 COMPLETE CBC W/AUTO DIFF WBC: CPT | Performed by: PHYSICIAN ASSISTANT

## 2019-02-20 PROCEDURE — 80053 COMPREHEN METABOLIC PANEL: CPT | Performed by: PHYSICIAN ASSISTANT

## 2019-02-20 PROCEDURE — 90791 PSYCH DIAGNOSTIC EVALUATION: CPT

## 2019-02-20 PROCEDURE — 99214 OFFICE O/P EST MOD 30 MIN: CPT | Performed by: FAMILY MEDICINE

## 2019-02-20 PROCEDURE — 83690 ASSAY OF LIPASE: CPT | Performed by: PHYSICIAN ASSISTANT

## 2019-02-20 PROCEDURE — 96360 HYDRATION IV INFUSION INIT: CPT

## 2019-02-20 PROCEDURE — 99284 EMERGENCY DEPT VISIT MOD MDM: CPT

## 2019-02-20 RX ORDER — SODIUM CHLORIDE 0.9 % (FLUSH) 0.9 %
10 SYRINGE (ML) INJECTION AS NEEDED
Status: DISCONTINUED | OUTPATIENT
Start: 2019-02-20 | End: 2019-02-20 | Stop reason: HOSPADM

## 2019-02-20 RX ORDER — POTASSIUM CHLORIDE 750 MG/1
40 CAPSULE, EXTENDED RELEASE ORAL ONCE
Status: COMPLETED | OUTPATIENT
Start: 2019-02-20 | End: 2019-02-20

## 2019-02-20 RX ADMIN — SODIUM CHLORIDE, POTASSIUM CHLORIDE, SODIUM LACTATE AND CALCIUM CHLORIDE 1000 ML: 600; 310; 30; 20 INJECTION, SOLUTION INTRAVENOUS at 17:54

## 2019-02-20 RX ADMIN — POTASSIUM CHLORIDE 40 MEQ: 750 CAPSULE, EXTENDED RELEASE ORAL at 19:13

## 2019-02-20 NOTE — PROGRESS NOTES
Subjective   Naina Suarez is a 73 y.o. female.     Chief Complaint   Patient presents with   • Anxiety   • Depression       HPI   74 y/o female presenting to the office today to discuss depression:    Depression:  Patient presents to the clinic today with her cousin, Usha. Usha notes that the patient recently had a break up with her significant other. Usha notes that the patient has had a decrease in appetite and has lost weight over the past year. Patient acknowledges that she can get dizzy at times. She notes that she has not had any falls, but she can loose her balance.     Patient notes that she has had trouble sleeping at night. Patient has stopped the Buspar due to vivid dreams. She has been prescribed zolpidem 5mg at night, but she is still having trouble sleeping. Patient states that she has not had the motivation to get out of bed on most days.     Patient also acknowledges that she is stressed out and worried constantly about her mother's health.  She also notes that she and her siblings are not seeing eye to eye right now concerning her mother's health.     Patient states that she has been more nauseated than usual. She states that she has trouble keeping fluids down. She also has had a decrease in urinary output; she is not urinating as frequently as she did in the past.     Patient does not have no thoughts of self harm; she declines any active suicidal thoughts. However, she notes that she does have thoughts of sleeping and not waking up.    Patient has not seen a therapist or psychiatrist since her last office visit. She states that she will call and make an appointment with a therapist.      Degenerative joint disease of lumbar spine:  Patient has been prescribed duloxetine 60mg. She is tolerating the dosage without any side effects.        Review of Systems   Constitutional: Positive for appetite change, fatigue and unexpected weight change (weight loss- 30lbs in the past 6 month). Negative  for fever.   HENT: Negative for congestion and sore throat.    Eyes: Negative for pain and visual disturbance.   Respiratory: Negative for cough and shortness of breath.    Cardiovascular: Negative for chest pain and palpitations.   Gastrointestinal: Positive for nausea. Negative for abdominal pain.   Genitourinary: Negative for dysuria and hematuria.   Musculoskeletal: Negative for arthralgias and gait problem.   Skin: Negative for rash and wound.   Allergic/Immunologic: Negative for food allergies and immunocompromised state.   Neurological: Positive for headaches. Negative for dizziness and syncope.   Psychiatric/Behavioral: Positive for dysphoric mood and sleep disturbance. Negative for agitation, hallucinations, self-injury and suicidal ideas. The patient is nervous/anxious.        Past Medical History:   Diagnosis Date   • Anxiety    • Caregiver stress    • Degenerative joint disease (DJD) of lumbar spine    • Degenerative joint disease (DJD) of lumbar spine    • Depression    • Hyperlipidemia    • Hypertension    • Hyperthyroidism    • Insomnia    • Migraine    • Osteopenia    • Valvular heart disease            Social History     Tobacco Use   • Smoking status: Never Smoker   • Smokeless tobacco: Never Used   Substance Use Topics   • Alcohol use: Yes     Comment: Rarely   • Drug use: No         Allergies   Allergen Reactions   • Sulfa Antibiotics Hives and Itching        Outpatient Medications Prior to Visit   Medication Sig Dispense Refill   • atorvastatin (LIPITOR) 40 MG tablet Take 1 tablet by mouth every night at bedtime. 90 tablet 1   • Biotin 5000 MCG tablet Take  by mouth Daily.     • Calcium Carbonate-Vit D-Min (CALCIUM 1200 PO) Take  by mouth Daily.     • Cholecalciferol (VITAMIN D3) 5000 units capsule capsule Take 5,000 Units by mouth Daily.     • Cyanocobalamin (B-12 PO) Take  by mouth Daily.     • DULoxetine (CYMBALTA) 60 MG capsule Take 1 capsule by mouth Daily. 90 capsule 1   • levothyroxine  (SYNTHROID) 50 MCG tablet Take 1 tablet by mouth Daily. 90 tablet 1   • Multiple Vitamins-Minerals (WOMENS MULTIVITAMIN PO) Take  by mouth Daily.     • nadolol (CORGARD) 80 MG tablet Take 1 tablet by mouth Daily. 90 tablet 1   • zolpidem (AMBIEN) 10 MG tablet Take 1 tablet by mouth At Night As Needed (When necessary). 30 tablet 0     Objective     Vitals:    02/20/19 1407   BP: 128/88   Pulse: 108   SpO2: 98%       Physical Exam   Constitutional: She appears well-developed and well-nourished. No distress.   HENT:   Head: Normocephalic and atraumatic.   Neck: No thyromegaly present.   Cardiovascular: Regular rhythm. Tachycardia present. Exam reveals no gallop and no friction rub.   Murmur heard.   Systolic murmur is present with a grade of 3/6.  Pulses:       Radial pulses are 2+ on the right side, and 2+ on the left side.   Pulmonary/Chest: Effort normal and breath sounds normal. No respiratory distress. She has no wheezes. She has no rhonchi. She has no rales.   Abdominal: Soft. Bowel sounds are normal. She exhibits no distension. There is no tenderness.   Lymphadenopathy:     She has no cervical adenopathy.   Skin: Skin is warm and dry. Capillary refill takes more than 3 seconds.   Psychiatric: Her speech is normal. Her mood appears anxious. She exhibits a depressed mood.   tearful       ASSESSMENT/PLAN                             Visit Diagnoses     Depression, unspecified depression type    -  Primary    Relevant Orders    Ambulatory Referral to Psychiatry  Pt denies active SI  Her cousin agrees to help her schedule an appointment with a Psychiatrist ASAP     Dehydration on physical exam, most likely due to decreased PO intake associated with severe depression  Patient sent to the ER for IV fluids and further w/u. Report given to the ED by phone           Patient Instructions   Please go to the ED to have fluids.   Please make sure you call and schedule an appointment with the psychiatrist.       Dehydration,  Adult  Dehydration is when there is not enough fluid or water in your body. This happens when you lose more fluids than you take in. Dehydration can range from mild to very bad. It should be treated right away to keep it from getting very bad.  Symptoms of mild dehydration may include:  · Thirst.  · Dry lips.  · Slightly dry mouth.  · Dry, warm skin.  · Dizziness.  Symptoms of moderate dehydration may include:  · Very dry mouth.  · Muscle cramps.  · Dark pee (urine). Pee may be the color of tea.  · Your body making less pee.  · Your eyes making fewer tears.  · Heartbeat that is uneven or faster than normal (palpitations).  · Headache.  · Light-headedness, especially when you stand up from sitting.  · Fainting (syncope).  Symptoms of very bad dehydration may include:  · Changes in skin, such as:  ? Cold and clammy skin.  ? Blotchy (mottled) or pale skin.  ? Skin that does not quickly return to normal after being lightly pinched and let go (poor skin turgor).  · Changes in body fluids, such as:  ? Feeling very thirsty.  ? Your eyes making fewer tears.  ? Not sweating when body temperature is high, such as in hot weather.  ? Your body making very little pee.  · Changes in vital signs, such as:  ? Weak pulse.  ? Pulse that is more than 100 beats a minute when you are sitting still.  ? Fast breathing.  ? Low blood pressure.  · Other changes, such as:  ? Sunken eyes.  ? Cold hands and feet.  ? Confusion.  ? Lack of energy (lethargy).  ? Trouble waking up from sleep.  ? Short-term weight loss.  ? Unconsciousness.  Follow these instructions at home:  · If told by your doctor, drink an ORS:  ? Make an ORS by using instructions on the package.  ? Start by drinking small amounts, about ½ cup (120 mL) every 5-10 minutes.  ? Slowly drink more until you have had the amount that your doctor said to have.  · Drink enough clear fluid to keep your pee clear or pale yellow. If you were told to drink an ORS, finish the ORS first, then  start slowly drinking clear fluids. Drink fluids such as:  ? Water. Do not drink only water by itself. Doing that can make the salt (sodium) level in your body get too low (hyponatremia).  ? Ice chips.  ? Fruit juice that you have added water to (diluted).  ? Low-calorie sports drinks.  · Avoid:  ? Alcohol.  ? Drinks that have a lot of sugar. These include high-calorie sports drinks, fruit juice that does not have water added, and soda.  ? Caffeine.  ? Foods that are greasy or have a lot of fat or sugar.  · Take over-the-counter and prescription medicines only as told by your doctor.  · Do not take salt tablets. Doing that can make the salt level in your body get too high (hypernatremia).  · Eat foods that have minerals (electrolytes). Examples include bananas, oranges, potatoes, tomatoes, and spinach.  · Keep all follow-up visits as told by your doctor. This is important.  Contact a doctor if:  · You have belly (abdominal) pain that:  ? Gets worse.  ? Stays in one area (localizes).  · You have a rash.  · You have a stiff neck.  · You get angry or annoyed more easily than normal (irritability).  · You are more sleepy than normal.  · You have a harder time waking up than normal.  · You feel:  ? Weak.  ? Dizzy.  ? Very thirsty.  · You have peed (urinated) only a small amount of very dark pee during 6-8 hours.  Get help right away if:  · You have symptoms of very bad dehydration.  · You cannot drink fluids without throwing up (vomiting).  · Your symptoms get worse with treatment.  · You have a fever.  · You have a very bad headache.  · You are throwing up or having watery poop (diarrhea) and it:  ? Gets worse.  ? Does not go away.  · You have blood or something green (bile) in your throw-up.  · You have blood in your poop (stool). This may cause poop to look black and tarry.  · You have not peed in 6-8 hours.  · You pass out (faint).  · Your heart rate when you are sitting still is more than 100 beats a minute.  · You  have trouble breathing.  This information is not intended to replace advice given to you by your health care provider. Make sure you discuss any questions you have with your health care provider.  Document Released: 10/14/2010 Document Revised: 07/07/2017 Document Reviewed: 02/10/2017  [employer] Interactive Patient Education © 2018 [employer]      Return in about 2 weeks (around 3/6/2019) for Recheck. and close follow up    I,Richardson Benz, am scribing for, and in the presence of,Roselyn Cantrell MD. 2/20/2019 3:27 PM    I, Roselyn Cantrell MD   personally, performed the services described in this documentation, as scribed by,Richardson Benz, in my presence, and it is both accurate and complete.    Roselyn Cantrell MD  02/27/19  6:22 PM

## 2019-02-20 NOTE — ED PROVIDER NOTES
MD ATTESTATION NOTE    Pt presents to the ED complaining of depression for the last month. Pt also c/o of dehydration and lack of appetite. Pt was put on Buspar which has not relieved the sx. Pt denies any thoughts of hurting herself.     On exam, the pt is resting comfortably, in no distress, and without focal neurologic deficit.     I agree with the plan to hydrate pt w/ IVF, check labs, and consult w/ [facility].    The LAILA and I have discussed this patient's history, physical exam, and treatment plan.    I have reviewed the documentation and personally had a face to face interaction with the patient. I affirm the documentation and agree with the treatment and plan.  The attached note describes my personal findings.    Documentation assistance provided by hayes Verdin for Dr. Delcid.  Information recorded by the scribe was done at my direction and has been verified and validated by me.       Sue Verdin  02/20/19 5600       Mika Delcid MD  02/20/19 6973

## 2019-02-20 NOTE — PATIENT INSTRUCTIONS
Please go to the ED to have fluids.   Please make sure you call and schedule an appointment with the psychiatrist.       Dehydration, Adult  Dehydration is when there is not enough fluid or water in your body. This happens when you lose more fluids than you take in. Dehydration can range from mild to very bad. It should be treated right away to keep it from getting very bad.  Symptoms of mild dehydration may include:  · Thirst.  · Dry lips.  · Slightly dry mouth.  · Dry, warm skin.  · Dizziness.  Symptoms of moderate dehydration may include:  · Very dry mouth.  · Muscle cramps.  · Dark pee (urine). Pee may be the color of tea.  · Your body making less pee.  · Your eyes making fewer tears.  · Heartbeat that is uneven or faster than normal (palpitations).  · Headache.  · Light-headedness, especially when you stand up from sitting.  · Fainting (syncope).  Symptoms of very bad dehydration may include:  · Changes in skin, such as:  ? Cold and clammy skin.  ? Blotchy (mottled) or pale skin.  ? Skin that does not quickly return to normal after being lightly pinched and let go (poor skin turgor).  · Changes in body fluids, such as:  ? Feeling very thirsty.  ? Your eyes making fewer tears.  ? Not sweating when body temperature is high, such as in hot weather.  ? Your body making very little pee.  · Changes in vital signs, such as:  ? Weak pulse.  ? Pulse that is more than 100 beats a minute when you are sitting still.  ? Fast breathing.  ? Low blood pressure.  · Other changes, such as:  ? Sunken eyes.  ? Cold hands and feet.  ? Confusion.  ? Lack of energy (lethargy).  ? Trouble waking up from sleep.  ? Short-term weight loss.  ? Unconsciousness.  Follow these instructions at home:  · If told by your doctor, drink an ORS:  ? Make an ORS by using instructions on the package.  ? Start by drinking small amounts, about ½ cup (120 mL) every 5-10 minutes.  ? Slowly drink more until you have had the amount that your doctor said to  have.  · Drink enough clear fluid to keep your pee clear or pale yellow. If you were told to drink an ORS, finish the ORS first, then start slowly drinking clear fluids. Drink fluids such as:  ? Water. Do not drink only water by itself. Doing that can make the salt (sodium) level in your body get too low (hyponatremia).  ? Ice chips.  ? Fruit juice that you have added water to (diluted).  ? Low-calorie sports drinks.  · Avoid:  ? Alcohol.  ? Drinks that have a lot of sugar. These include high-calorie sports drinks, fruit juice that does not have water added, and soda.  ? Caffeine.  ? Foods that are greasy or have a lot of fat or sugar.  · Take over-the-counter and prescription medicines only as told by your doctor.  · Do not take salt tablets. Doing that can make the salt level in your body get too high (hypernatremia).  · Eat foods that have minerals (electrolytes). Examples include bananas, oranges, potatoes, tomatoes, and spinach.  · Keep all follow-up visits as told by your doctor. This is important.  Contact a doctor if:  · You have belly (abdominal) pain that:  ? Gets worse.  ? Stays in one area (localizes).  · You have a rash.  · You have a stiff neck.  · You get angry or annoyed more easily than normal (irritability).  · You are more sleepy than normal.  · You have a harder time waking up than normal.  · You feel:  ? Weak.  ? Dizzy.  ? Very thirsty.  · You have peed (urinated) only a small amount of very dark pee during 6-8 hours.  Get help right away if:  · You have symptoms of very bad dehydration.  · You cannot drink fluids without throwing up (vomiting).  · Your symptoms get worse with treatment.  · You have a fever.  · You have a very bad headache.  · You are throwing up or having watery poop (diarrhea) and it:  ? Gets worse.  ? Does not go away.  · You have blood or something green (bile) in your throw-up.  · You have blood in your poop (stool). This may cause poop to look black and tarry.  · You have  not peed in 6-8 hours.  · You pass out (faint).  · Your heart rate when you are sitting still is more than 100 beats a minute.  · You have trouble breathing.  This information is not intended to replace advice given to you by your health care provider. Make sure you discuss any questions you have with your health care provider.  Document Released: 10/14/2010 Document Revised: 07/07/2017 Document Reviewed: 02/10/2017  [employer] Interactive Patient Education © 2018 [employer]

## 2019-02-20 NOTE — ED PROVIDER NOTES
"EMERGENCY DEPARTMENT ENCOUNTER    Room Number:  29/29  Date seen:  2/20/2019  Time seen: 5:18 PM  PCP: Roselyn Cantrell MD  Historian: Patient      HPI:  Chief Complaint: Depression  Context: Naina Suarez is a 73 y.o. female. Pt reports that her mother is currently in [place] and pt has had relationship difficulties with her significant other. Since these two situational incidents have been occurring since 08/2018, pt has had depression. Pt reports that her depression has been progressively worsening since initial onset. Pt has also had decreased appetite/PO intake, lightheadedness, and insomnia. Pt denies SI, chest pain, dyspnea, and headache. Pt also denies illicit drug use and reports that she drinks ETOH recreationally, once or twice a month. Pt states that she was seen for this at her PMD's office earlier today and was referred to the ER for further evaluation. Pt reports that she has access to counseling. There are no other complaints at this time.     Onset: Gradual in onset  Location: Psychological  Radiation: N/A  Duration: Onset since 08/2018  Timing: Constant  Character: \"depression\"  Aggravating Factors: Situational incidents  Alleviating Factors: Nothing  Severity: Moderate        ALLERGIES  Sulfa antibiotics    PAST MEDICAL HISTORY  Active Ambulatory Problems     Diagnosis Date Noted   • Benign essential hypertension 08/29/2016   • Mixed anxiety depressive disorder 08/29/2016   • Degeneration of intervertebral disc of lumbosacral region 08/29/2016   • Hypercholesterolemia 08/29/2016   • Insomnia 08/29/2016   • Osteopenia 08/29/2016   • Migraine without status migrainosus, not intractable 08/29/2016   • Acquired hypothyroidism 08/06/2018   • Degenerative joint disease (DJD) of lumbar spine    • Caregiver stress    • Valvular heart disease      Resolved Ambulatory Problems     Diagnosis Date Noted   • Chronic fatigue syndrome 08/29/2016   • Atypical migraine 08/29/2016     Past Medical " History:   Diagnosis Date   • Caregiver stress    • Degenerative joint disease (DJD) of lumbar spine    • Degenerative joint disease (DJD) of lumbar spine    • Hyperlipidemia    • Hypertension    • Hyperthyroidism    • Insomnia    • Migraine    • Osteopenia    • Valvular heart disease        PAST SURGICAL HISTORY  Past Surgical History:   Procedure Laterality Date   • CATARACT EXTRACTION Right    • TOE SURGERY     • WISDOM TOOTH EXTRACTION         FAMILY HISTORY  Family History   Problem Relation Age of Onset   • Heart failure Mother    • Cataracts Mother    • Heart attack Father    • Lung disease Father    • Retinal detachment Sister    • Anxiety disorder Sister    • Bipolar disorder Sister    • Obesity Sister    • Fibromyalgia Sister    • Heart murmur Sister        SOCIAL HISTORY  Social History     Socioeconomic History   • Marital status:      Spouse name: Not on file   • Number of children: Not on file   • Years of education: Not on file   • Highest education level: Not on file   Social Needs   • Financial resource strain: Not on file   • Food insecurity - worry: Not on file   • Food insecurity - inability: Not on file   • Transportation needs - medical: Not on file   • Transportation needs - non-medical: Not on file   Occupational History   • Not on file   Tobacco Use   • Smoking status: Never Smoker   • Smokeless tobacco: Never Used   Substance and Sexual Activity   • Alcohol use: Yes     Comment: Rarely   • Drug use: No   • Sexual activity: Not on file   Other Topics Concern   • Not on file   Social History Narrative   • Not on file           REVIEW OF SYSTEMS  Review of Systems   Constitutional: Positive for appetite change (decreased appetite/PO intake). Negative for chills.   HENT: Negative for congestion, rhinorrhea and sore throat.    Eyes: Negative for pain.   Respiratory: Negative for cough and shortness of breath.    Cardiovascular: Negative for chest pain and leg swelling.   Gastrointestinal:  Negative for diarrhea, nausea and vomiting.   Genitourinary: Negative for difficulty urinating, dysuria, flank pain and frequency.   Musculoskeletal: Negative for myalgias, neck pain and neck stiffness.   Skin: Negative for rash.   Neurological: Positive for light-headedness. Negative for speech difficulty, weakness, numbness and headaches.   Psychiatric/Behavioral: Positive for sleep disturbance (insomnia). Negative for suicidal ideas.        Depression   All other systems reviewed and are negative.          PHYSICAL EXAM  ED Triage Vitals   Temp Heart Rate Resp BP SpO2   02/20/19 1644 02/20/19 1644 02/20/19 1644 02/20/19 1655 02/20/19 1644   97.7 °F (36.5 °C) (!) 122 18 125/76 100 %      Temp src Heart Rate Source Patient Position BP Location FiO2 (%)   02/20/19 1644 02/20/19 1644 -- -- --   Tympanic Monitor        Physical Exam   Constitutional: She is oriented to person, place, and time. No distress.   HENT:   Head: Normocephalic.   Mouth/Throat: Mucous membranes are normal.   Eyes: EOM are normal. Pupils are equal, round, and reactive to light.   Neck: Normal range of motion. Neck supple.   Cardiovascular: Normal rate, regular rhythm and normal heart sounds.   Pulmonary/Chest: Effort normal and breath sounds normal. No respiratory distress. She has no decreased breath sounds. She has no wheezes. She has no rhonchi. She has no rales.   Abdominal: Soft. There is no tenderness. There is no rebound and no guarding.   Musculoskeletal: Normal range of motion.   Neurological: She is alert and oriented to person, place, and time. She has normal sensation.   Skin: Skin is warm.   Psychiatric: She exhibits a depressed mood.   Pt is tearful.    Nursing note and vitals reviewed.          LAB RESULTS  Recent Results (from the past 24 hour(s))   Light Blue Top    Collection Time: 02/20/19  5:03 PM   Result Value Ref Range    Extra Tube hold for add-on    Green Top (Gel)    Collection Time: 02/20/19  5:03 PM   Result Value  Ref Range    Extra Tube Hold for add-ons.    Lavender Top    Collection Time: 02/20/19  5:03 PM   Result Value Ref Range    Extra Tube hold for add-on    Gold Top - SST    Collection Time: 02/20/19  5:03 PM   Result Value Ref Range    Extra Tube Hold for add-ons.    Comprehensive Metabolic Panel    Collection Time: 02/20/19  5:03 PM   Result Value Ref Range    Glucose 143 (H) 65 - 99 mg/dL    BUN 9 8 - 23 mg/dL    Creatinine 0.64 0.57 - 1.00 mg/dL    Sodium 142 136 - 145 mmol/L    Potassium 3.2 (L) 3.5 - 5.2 mmol/L    Chloride 104 98 - 107 mmol/L    CO2 24.6 22.0 - 29.0 mmol/L    Calcium 10.0 8.6 - 10.5 mg/dL    Total Protein 7.2 6.0 - 8.5 g/dL    Albumin 4.10 3.50 - 5.20 g/dL    ALT (SGPT) 25 1 - 33 U/L    AST (SGOT) 30 1 - 32 U/L    Alkaline Phosphatase 73 39 - 117 U/L    Total Bilirubin 0.9 0.1 - 1.2 mg/dL    eGFR Non African Amer 91 >60 mL/min/1.73    Globulin 3.1 gm/dL    A/G Ratio 1.3 g/dL    BUN/Creatinine Ratio 14.1 7.0 - 25.0    Anion Gap 13.4 mmol/L   Lipase    Collection Time: 02/20/19  5:03 PM   Result Value Ref Range    Lipase 38 13 - 60 U/L   Ethanol    Collection Time: 02/20/19  5:03 PM   Result Value Ref Range    Ethanol <10 0 - 10 mg/dL    Ethanol % <0.010 %   CBC Auto Differential    Collection Time: 02/20/19  5:03 PM   Result Value Ref Range    WBC 4.63 3.40 - 10.80 10*3/mm3    RBC 4.42 3.77 - 5.28 10*6/mm3    Hemoglobin 14.6 12.0 - 15.9 g/dL    Hematocrit 43.4 34.0 - 46.6 %    MCV 98.2 (H) 79.0 - 97.0 fL    MCH 33.0 26.6 - 33.0 pg    MCHC 33.6 31.5 - 35.7 g/dL    RDW 12.3 12.3 - 15.4 %    RDW-SD 44.8 37.0 - 54.0 fl    MPV 9.9 6.0 - 12.0 fL    Platelets 238 140 - 450 10*3/mm3    Neutrophil % 55.3 42.7 - 76.0 %    Lymphocyte % 33.7 19.6 - 45.3 %    Monocyte % 8.0 5.0 - 12.0 %    Eosinophil % 1.9 0.3 - 6.2 %    Basophil % 0.9 0.0 - 1.5 %    Immature Grans % 0.2 0.0 - 0.5 %    Neutrophils, Absolute 2.56 1.40 - 7.00 10*3/mm3    Lymphocytes, Absolute 1.56 0.70 - 3.10 10*3/mm3    Monocytes, Absolute  0.37 0.10 - 0.90 10*3/mm3    Eosinophils, Absolute 0.09 0.00 - 0.40 10*3/mm3    Basophils, Absolute 0.04 0.00 - 0.20 10*3/mm3    Immature Grans, Absolute 0.01 0.00 - 0.05 10*3/mm3    nRBC 0.0 0.0 - 0.0 /100 WBC   Urinalysis With Microscopic If Indicated (No Culture) - Urine, Clean Catch    Collection Time: 02/20/19  5:53 PM   Result Value Ref Range    Color, UA Yellow Yellow, Straw    Appearance, UA Clear Clear    pH, UA 6.5 5.0 - 8.0    Specific Gravity, UA 1.018 1.005 - 1.030    Glucose, UA Negative Negative    Ketones, UA Negative Negative    Bilirubin, UA Negative Negative    Blood, UA Negative Negative    Protein, UA Negative Negative    Leuk Esterase, UA Trace (A) Negative    Nitrite, UA Negative Negative    Urobilinogen, UA 1.0 E.U./dL 0.2 - 1.0 E.U./dL   Urine Drug Screen - Urine, Clean Catch    Collection Time: 02/20/19  5:53 PM   Result Value Ref Range    Amphet/Methamphet, Screen Negative Negative    Barbiturates Screen, Urine Negative Negative    Benzodiazepine Screen, Urine Negative Negative    Cocaine Screen, Urine Negative Negative    Opiate Screen Negative Negative    THC, Screen, Urine Negative Negative    Methadone Screen, Urine Negative Negative    Oxycodone Screen, Urine Negative Negative   Urinalysis, Microscopic Only - Urine, Clean Catch    Collection Time: 02/20/19  5:53 PM   Result Value Ref Range    RBC, UA 0-2 None Seen, 0-2 /HPF    WBC, UA 0-2 None Seen, 0-2 /HPF    Bacteria, UA None Seen None Seen /HPF    Squamous Epithelial Cells, UA 0-2 None Seen, 0-2 /HPF    Hyaline Casts, UA 0-2 None Seen /LPF    Methodology Automated Microscopy        I ordered the above labs and reviewed the results.          MEDICATIONS GIVEN IN ER  Medications   sodium chloride 0.9 % flush 10 mL (not administered)   lactated ringers bolus 1,000 mL (0 mL Intravenous Stopped 2/20/19 1913)   potassium chloride (MICRO-K) CR capsule 40 mEq (40 mEq Oral Given 2/20/19 1913)  "            PROCEDURES  Procedures          PROGRESS AND CONSULTS    Progress Notes:          5:32 PM:  UA, UDS, BAL, and blood work ordered for further evaluation. IV fluids ordered to hydrate pt.     5:40 PM:  Reviewed pt's history and workup with Dr. Delcid (ER physician).  After a bedside evaluation, Dr. Delcid agrees with the plan of care.    6:30 PM:  Pt's K+ is 3.2 - KCl PO ordered. ACCESS consulted.    7:50 PM:  ACCESS has evaluated pt at bedside and has cleared pt for discharge. I agree with this. Patient denies SI. Pt will be provided with outpatient f/u referrals.     8:10 PM:  Rechecked pt. Pt is resting comfortably and appears in no acute distress. Pt reports that she feels better after IV fluids. Informed pt that her CBC and CMP are relatively unremarkable. However, pt's K+ is 3.2 (pt was administered KCl PO in the ER). Pt was advised to eat potassium-enriched foods and recheck with her PCP, to hydrate, and to f/u per ACCESS referrals. Strict RTER warnings given. Pt agrees with plan for discharge.          Disposition vitals:  /78   Pulse 88   Temp 97.7 °F (36.5 °C) (Tympanic)   Resp 18   Ht 162.6 cm (64\")   Wt 54.2 kg (119 lb 6.4 oz)   SpO2 99%   BMI 20.49 kg/m²         DIAGNOSIS  Final diagnoses:   Depression, unspecified depression type   Low serum potassium           DISPOSITION  Pt discharged.    DISCHARGE    Patient discharged in stable condition.    Reviewed implications of results, diagnosis, meds, responsibility to follow up, warning signs and symptoms of possible worsening, potential complications and reasons to return to ER.    Patient/Family voiced understanding of above instructions.    Discussed plan for discharge, as there is no emergent indication for admission. Pt/family is agreeable and understands need for follow up and repeat testing. Pt is aware that discharge does not mean that nothing is wrong but it indicates no emergency is present that requires admission and " they must continue care with follow-up as given below or physician of their choice.     FOLLOW-UP  Roselyn Cantrell MD  [address] [facility] [address] [phone]              Documentation assistance provided by hayes Ulloa for Ms. Lee Ann Marcano PA-C.  Information recorded by the scribe was done at my direction and has been verified and validated by me.                    Katie Ulloa  02/20/19 [id number]       Lee Ann Marcano PA  02/23/19 [id number]

## 2019-02-21 NOTE — CONSULTS
"Pt reports has no current mental health provider, but PMD (Dr. Cantrell) provided list of mental health providers, and that she can contact her insurance for additional resources. Reports that mom is 98, was referred to [facility] in September, mom still living. Reports that she also had a breakup of 3 year relationship about 1 month ago.     Reports poor appetite. Has been sleeping poorly, has been on ambien 5 mg for years QHS, Dr. Cantrell recommended she increase this to 10 mg QHS.     Volunteers that \"I don't want to harm myself or anyone else\".     Reports she has had times of wishing she could go to sleep and not wake up, but that these thoughts have never progressed to SI. Denies any hx of self harm behavior.    Reports she has social support, primarily of second cousin. Reports she is not close to siblings, and have argued about mom's care.     Reports she drinks socially, when she meets her classmates once monthly, and drinks at that time. Reports drinking about that once monthly. Denies street drug use.     Plan to d/c with with outpatient referrals, DARREN Marcano agrees with plan.               "

## 2019-02-21 NOTE — DISCHARGE INSTRUCTIONS
Follow up with outpatient psychiatry as discussed.  Increase dietary potassium and have your level rechecked by your pcp at your next appointment in 2 weeks.    Return to the ER as needed.

## 2019-03-07 ENCOUNTER — TELEPHONE (OUTPATIENT)
Dept: FAMILY MEDICINE CLINIC | Facility: CLINIC | Age: 74
End: 2019-03-07

## 2019-03-07 NOTE — TELEPHONE ENCOUNTER
Patient met with Dr. Beasley, a psychologist at [facility]. She told him that Dr. Cantrell had put her on Buspar 5mg, but since that wasn't working she stopped taking it. He asked if we would put her on an anti-depression medication since she is only on something for her anxiety right now. She meets again with him next week.

## 2019-03-08 DIAGNOSIS — F32.A DEPRESSION, UNSPECIFIED DEPRESSION TYPE: Primary | ICD-10-CM

## 2019-03-08 RX ORDER — CITALOPRAM 10 MG/1
10 TABLET ORAL DAILY
Qty: 30 TABLET | Refills: 1 | Status: SHIPPED | OUTPATIENT
Start: 2019-03-08 | End: 2019-05-09 | Stop reason: SDUPTHER

## 2019-03-08 NOTE — TELEPHONE ENCOUNTER
Please let pt know that I will call in a Rx for Celexa, and I would like for her to follow up next week with us at [place].  If she develops dizziness, flushing, or nausea while taking Celexa she should stop taking and call us.

## 2019-03-11 NOTE — TELEPHONE ENCOUNTER
Called and spoke with patient to let her know Rx has been sent in  And ask if she will schedule an appointment with us this week. She id seeing Dr. Beasley on Thursday afternoon and will call back to schedule an OV with us. I instructed patient on side effects and if she doesn't feel medication is working properly, please let us know.

## 2019-03-22 RX ORDER — NADOLOL 80 MG/1
80 TABLET ORAL DAILY
Qty: 90 TABLET | Refills: 0 | OUTPATIENT
Start: 2019-03-22

## 2019-04-03 RX ORDER — ATORVASTATIN CALCIUM 40 MG/1
40 TABLET, FILM COATED ORAL
Qty: 90 TABLET | Refills: 0 | Status: SHIPPED | OUTPATIENT
Start: 2019-04-03

## 2019-05-01 DIAGNOSIS — G47.00 INSOMNIA, UNSPECIFIED TYPE: ICD-10-CM

## 2019-05-01 RX ORDER — ZOLPIDEM TARTRATE 10 MG/1
TABLET ORAL
Qty: 90 TABLET | Refills: 0 | OUTPATIENT
Start: 2019-05-01

## 2019-05-09 ENCOUNTER — TELEPHONE (OUTPATIENT)
Dept: FAMILY MEDICINE CLINIC | Facility: CLINIC | Age: 74
End: 2019-05-09

## 2019-05-09 DIAGNOSIS — F32.A DEPRESSION, UNSPECIFIED DEPRESSION TYPE: ICD-10-CM

## 2019-05-09 RX ORDER — CITALOPRAM HYDROBROMIDE 10 MG/1
10 TABLET ORAL DAILY
Qty: 30 TABLET | Refills: 1 | Status: SHIPPED | OUTPATIENT
Start: 2019-05-09 | End: 2021-12-13

## 2019-05-09 NOTE — TELEPHONE ENCOUNTER
----- Message from Leana Dove MA sent at 5/8/2019  8:38 AM EDT -----  Please call patient and remind her that Dr. Cantrell wanted her to have labs drawn and a follow up OV  ----- Message -----  From: SYSTEM  Sent: 2/4/2019  12:13 AM  To: [facility]

## 2019-06-09 DIAGNOSIS — G47.00 INSOMNIA, UNSPECIFIED TYPE: ICD-10-CM

## 2019-06-10 RX ORDER — ZOLPIDEM TARTRATE 5 MG/1
5 TABLET ORAL NIGHTLY PRN
Qty: 30 TABLET | Refills: 0 | Status: SHIPPED | OUTPATIENT
Start: 2019-06-10

## 2019-06-10 NOTE — TELEPHONE ENCOUNTER
I do not prescribe 10 mg zolpidem to women, it is too dangerous. I will give a 30 day script for 10 mg. She needs to find a new doctor if she is not aware of that.

## 2019-07-07 DIAGNOSIS — F41.9 ANXIETY: ICD-10-CM

## 2019-07-08 RX ORDER — BUSPIRONE HYDROCHLORIDE 5 MG/1
TABLET ORAL
Qty: 90 TABLET | Refills: 0 | OUTPATIENT
Start: 2019-07-08

## 2019-08-22 DIAGNOSIS — F41.9 ANXIETY: ICD-10-CM

## 2019-08-22 DIAGNOSIS — M47.816 OSTEOARTHRITIS OF LUMBAR SPINE, UNSPECIFIED SPINAL OSTEOARTHRITIS COMPLICATION STATUS: ICD-10-CM

## 2019-08-22 RX ORDER — DULOXETIN HYDROCHLORIDE 60 MG/1
60 CAPSULE, DELAYED RELEASE ORAL DAILY
Qty: 90 CAPSULE | Refills: 1 | Status: SHIPPED | OUTPATIENT
Start: 2019-08-22 | End: 2019-11-27 | Stop reason: SDUPTHER

## 2019-09-18 ENCOUNTER — TRANSCRIBE ORDERS (OUTPATIENT)
Dept: ADMINISTRATIVE | Facility: HOSPITAL | Age: 74
End: 2019-09-18

## 2019-09-18 DIAGNOSIS — R06.02 SOB (SHORTNESS OF BREATH): ICD-10-CM

## 2019-09-18 DIAGNOSIS — I34.1 BARLOW'S SYNDROME: Primary | ICD-10-CM

## 2019-09-23 ENCOUNTER — HOSPITAL ENCOUNTER (OUTPATIENT)
Dept: CARDIOLOGY | Facility: HOSPITAL | Age: 74
End: 2019-09-23

## 2019-09-25 ENCOUNTER — APPOINTMENT (OUTPATIENT)
Dept: CARDIOLOGY | Facility: HOSPITAL | Age: 74
End: 2019-09-25

## 2019-10-04 ENCOUNTER — HOSPITAL ENCOUNTER (OUTPATIENT)
Dept: CARDIOLOGY | Facility: HOSPITAL | Age: 74
Discharge: HOME | End: 2019-10-04
Admitting: NURSE PRACTITIONER

## 2019-10-04 VITALS
WEIGHT: 119 LBS | HEIGHT: 64 IN | DIASTOLIC BLOOD PRESSURE: 62 MMHG | SYSTOLIC BLOOD PRESSURE: 102 MMHG | BODY MASS INDEX: 20.32 KG/M2

## 2019-10-04 DIAGNOSIS — R06.02 SOB (SHORTNESS OF BREATH): ICD-10-CM

## 2019-10-04 DIAGNOSIS — I34.1 BARLOW'S SYNDROME: ICD-10-CM

## 2019-10-04 LAB
AORTIC ROOT ANNULUS: 1.6 CM
ASCENDING AORTA: 2.9 CM
BH CV ECHO MEAS - ACS: 1.9 CM
BH CV ECHO MEAS - AO MAX PG (FULL): 2.1 MMHG
BH CV ECHO MEAS - AO MAX PG: 5.5 MMHG
BH CV ECHO MEAS - AO MEAN PG (FULL): 0.52 MMHG
BH CV ECHO MEAS - AO MEAN PG: 2 MMHG
BH CV ECHO MEAS - AO V2 MAX: 116.7 CM/SEC
BH CV ECHO MEAS - AO V2 MEAN: 62 CM/SEC
BH CV ECHO MEAS - AO V2 VTI: 24.9 CM
BH CV ECHO MEAS - ASC AORTA: 2.9 CM
BH CV ECHO MEAS - AVA(I,A): 2.1 CM^2
BH CV ECHO MEAS - AVA(I,D): 2.1 CM^2
BH CV ECHO MEAS - AVA(V,A): 1.9 CM^2
BH CV ECHO MEAS - AVA(V,D): 1.9 CM^2
BH CV ECHO MEAS - BSA(HAYCOCK): 1.6 M^2
BH CV ECHO MEAS - BSA: 1.6 M^2
BH CV ECHO MEAS - BZI_BMI: 21.1 KILOGRAMS/M^2
BH CV ECHO MEAS - BZI_METRIC_HEIGHT: 162.6 CM
BH CV ECHO MEAS - BZI_METRIC_WEIGHT: 55.8 KG
BH CV ECHO MEAS - EDV(MOD-SP2): 47 ML
BH CV ECHO MEAS - EDV(MOD-SP4): 44 ML
BH CV ECHO MEAS - EDV(TEICH): 79.7 ML
BH CV ECHO MEAS - EF(CUBED): 90 %
BH CV ECHO MEAS - EF(MOD-BP): 62 %
BH CV ECHO MEAS - EF(MOD-SP2): 59.6 %
BH CV ECHO MEAS - EF(MOD-SP4): 63.6 %
BH CV ECHO MEAS - EF(TEICH): 84.8 %
BH CV ECHO MEAS - ESV(MOD-SP2): 19 ML
BH CV ECHO MEAS - ESV(MOD-SP4): 16 ML
BH CV ECHO MEAS - ESV(TEICH): 12.1 ML
BH CV ECHO MEAS - FS: 53.5 %
BH CV ECHO MEAS - IVS/LVPW: 1.1
BH CV ECHO MEAS - IVSD: 0.83 CM
BH CV ECHO MEAS - LAT PEAK E' VEL: 10 CM/SEC
BH CV ECHO MEAS - LV DIASTOLIC VOL/BSA (35-75): 27.7 ML/M^2
BH CV ECHO MEAS - LV MASS(C)D: 102.1 GRAMS
BH CV ECHO MEAS - LV MASS(C)DI: 64.2 GRAMS/M^2
BH CV ECHO MEAS - LV MAX PG: 3.3 MMHG
BH CV ECHO MEAS - LV MEAN PG: 1.5 MMHG
BH CV ECHO MEAS - LV SYSTOLIC VOL/BSA (12-30): 10.1 ML/M^2
BH CV ECHO MEAS - LV V1 MAX: 91.2 CM/SEC
BH CV ECHO MEAS - LV V1 MEAN: 54 CM/SEC
BH CV ECHO MEAS - LV V1 VTI: 21.8 CM
BH CV ECHO MEAS - LVIDD: 4.2 CM
BH CV ECHO MEAS - LVIDS: 2 CM
BH CV ECHO MEAS - LVLD AP2: 7.5 CM
BH CV ECHO MEAS - LVLD AP4: 6.7 CM
BH CV ECHO MEAS - LVLS AP2: 6.4 CM
BH CV ECHO MEAS - LVLS AP4: 6.4 CM
BH CV ECHO MEAS - LVOT AREA (M): 2.5 CM^2
BH CV ECHO MEAS - LVOT AREA: 2.4 CM^2
BH CV ECHO MEAS - LVOT DIAM: 1.8 CM
BH CV ECHO MEAS - LVPWD: 0.77 CM
BH CV ECHO MEAS - MED PEAK E' VEL: 11 CM/SEC
BH CV ECHO MEAS - MR MAX PG: 41.4 MMHG
BH CV ECHO MEAS - MR MAX VEL: 321.7 CM/SEC
BH CV ECHO MEAS - MV A DUR: 0.12 SEC
BH CV ECHO MEAS - MV A MAX VEL: 60.1 CM/SEC
BH CV ECHO MEAS - MV DEC SLOPE: 352.7 CM/SEC^2
BH CV ECHO MEAS - MV DEC TIME: 0.17 SEC
BH CV ECHO MEAS - MV E MAX VEL: 74.2 CM/SEC
BH CV ECHO MEAS - MV E/A: 1.2
BH CV ECHO MEAS - MV MAX PG: 3.8 MMHG
BH CV ECHO MEAS - MV MEAN PG: 1.8 MMHG
BH CV ECHO MEAS - MV P1/2T MAX VEL: 69.4 CM/SEC
BH CV ECHO MEAS - MV P1/2T: 57.6 MSEC
BH CV ECHO MEAS - MV V2 MAX: 97.7 CM/SEC
BH CV ECHO MEAS - MV V2 MEAN: 63.4 CM/SEC
BH CV ECHO MEAS - MV V2 VTI: 27.5 CM
BH CV ECHO MEAS - MVA P1/2T LCG: 3.2 CM^2
BH CV ECHO MEAS - MVA(P1/2T): 3.8 CM^2
BH CV ECHO MEAS - MVA(VTI): 1.9 CM^2
BH CV ECHO MEAS - PA ACC TIME: 0.14 SEC
BH CV ECHO MEAS - PA MAX PG (FULL): 0.28 MMHG
BH CV ECHO MEAS - PA MAX PG: 2.1 MMHG
BH CV ECHO MEAS - PA PR(ACCEL): 14 MMHG
BH CV ECHO MEAS - PA V2 MAX: 72.4 CM/SEC
BH CV ECHO MEAS - PULM A REVS DUR: 0.09 SEC
BH CV ECHO MEAS - PULM A REVS VEL: 17.9 CM/SEC
BH CV ECHO MEAS - PULM DIAS VEL: 42.2 CM/SEC
BH CV ECHO MEAS - PULM S/D: 1.1
BH CV ECHO MEAS - PULM SYS VEL: 46.1 CM/SEC
BH CV ECHO MEAS - PVA(V,A): 1.8 CM^2
BH CV ECHO MEAS - PVA(V,D): 1.8 CM^2
BH CV ECHO MEAS - QP/QS: 0.61
BH CV ECHO MEAS - RAP SYSTOLE: 3 MMHG
BH CV ECHO MEAS - RV MAX PG: 1.8 MMHG
BH CV ECHO MEAS - RV MEAN PG: 0.93 MMHG
BH CV ECHO MEAS - RV V1 MAX: 67.4 CM/SEC
BH CV ECHO MEAS - RV V1 MEAN: 43.9 CM/SEC
BH CV ECHO MEAS - RV V1 VTI: 17.3 CM
BH CV ECHO MEAS - RVOT AREA: 1.9 CM^2
BH CV ECHO MEAS - RVOT DIAM: 1.6 CM
BH CV ECHO MEAS - RVSP: 26.9 MMHG
BH CV ECHO MEAS - SI(CUBED): 42.7 ML/M^2
BH CV ECHO MEAS - SI(LVOT): 33.5 ML/M^2
BH CV ECHO MEAS - SI(MOD-SP2): 17.6 ML/M^2
BH CV ECHO MEAS - SI(MOD-SP4): 17.6 ML/M^2
BH CV ECHO MEAS - SI(TEICH): 42.5 ML/M^2
BH CV ECHO MEAS - SUP REN AO DIAM: 1.5 CM
BH CV ECHO MEAS - SV(CUBED): 67.9 ML
BH CV ECHO MEAS - SV(LVOT): 53.3 ML
BH CV ECHO MEAS - SV(MOD-SP2): 28 ML
BH CV ECHO MEAS - SV(MOD-SP4): 28 ML
BH CV ECHO MEAS - SV(RVOT): 32.7 ML
BH CV ECHO MEAS - SV(TEICH): 67.6 ML
BH CV ECHO MEAS - TAPSE (>1.6): 2 CM2
BH CV ECHO MEAS - TR MAX VEL: 244.3 CM/SEC
BH CV ECHO MEASUREMENTS AVERAGE E/E' RATIO: 7.07
BH CV XLRA - RV BASE: 2.5 CM
BH CV XLRA - TDI S': 13 CM/SEC
LEFT ATRIUM VOLUME INDEX: 11 ML/M2
SINUS: 3 CM
STJ: 2.6 CM

## 2019-10-04 PROCEDURE — 93306 TTE W/DOPPLER COMPLETE: CPT

## 2019-10-04 PROCEDURE — 93306 TTE W/DOPPLER COMPLETE: CPT | Performed by: INTERNAL MEDICINE

## 2019-11-27 DIAGNOSIS — M47.816 OSTEOARTHRITIS OF LUMBAR SPINE, UNSPECIFIED SPINAL OSTEOARTHRITIS COMPLICATION STATUS: ICD-10-CM

## 2019-11-27 DIAGNOSIS — F41.9 ANXIETY: ICD-10-CM

## 2019-11-27 RX ORDER — DULOXETIN HYDROCHLORIDE 60 MG/1
60 CAPSULE, DELAYED RELEASE ORAL DAILY
Qty: 30 CAPSULE | Refills: 0 | Status: SHIPPED | OUTPATIENT
Start: 2019-11-27 | End: 2019-12-31

## 2019-12-30 DIAGNOSIS — M47.816 OSTEOARTHRITIS OF LUMBAR SPINE, UNSPECIFIED SPINAL OSTEOARTHRITIS COMPLICATION STATUS: ICD-10-CM

## 2019-12-30 DIAGNOSIS — F41.9 ANXIETY: ICD-10-CM

## 2019-12-31 RX ORDER — DULOXETIN HYDROCHLORIDE 60 MG/1
60 CAPSULE, DELAYED RELEASE ORAL DAILY
Qty: 15 CAPSULE | Refills: 0 | Status: SHIPPED | OUTPATIENT
Start: 2019-12-31

## 2021-12-07 PROBLEM — R00.1 BRADYCARDIA, SINUS: Status: ACTIVE | Noted: 2021-12-07

## 2021-12-13 ENCOUNTER — OFFICE VISIT (OUTPATIENT)
Dept: CARDIOLOGY | Facility: CLINIC | Age: 76
End: 2021-12-13

## 2021-12-13 VITALS
HEART RATE: 115 BPM | SYSTOLIC BLOOD PRESSURE: 136 MMHG | WEIGHT: 144 LBS | BODY MASS INDEX: 24.59 KG/M2 | DIASTOLIC BLOOD PRESSURE: 75 MMHG | HEIGHT: 64 IN

## 2021-12-13 DIAGNOSIS — R00.0 TACHYCARDIA: ICD-10-CM

## 2021-12-13 DIAGNOSIS — I34.1 BARLOW SYNDROME: ICD-10-CM

## 2021-12-13 DIAGNOSIS — E78.2 MIXED HYPERLIPIDEMIA: ICD-10-CM

## 2021-12-13 DIAGNOSIS — R00.1 BRADYCARDIA, SINUS: ICD-10-CM

## 2021-12-13 DIAGNOSIS — I38 VALVULAR HEART DISEASE: ICD-10-CM

## 2021-12-13 DIAGNOSIS — I10 BENIGN ESSENTIAL HYPERTENSION: Primary | ICD-10-CM

## 2021-12-13 PROCEDURE — 99204 OFFICE O/P NEW MOD 45 MIN: CPT | Performed by: INTERNAL MEDICINE

## 2021-12-13 PROCEDURE — 93000 ELECTROCARDIOGRAM COMPLETE: CPT | Performed by: INTERNAL MEDICINE

## 2021-12-13 RX ORDER — BUPROPION HYDROCHLORIDE 150 MG/1
150 TABLET ORAL DAILY
COMMUNITY

## 2021-12-13 RX ORDER — METOPROLOL SUCCINATE 25 MG/1
25 TABLET, EXTENDED RELEASE ORAL DAILY
Qty: 90 TABLET | Refills: 3 | Status: SHIPPED | OUTPATIENT
Start: 2021-12-13 | End: 2022-02-25 | Stop reason: SDUPTHER

## 2021-12-13 NOTE — PROGRESS NOTES
Date of Office Visit: 2021  Encounter Provider: Dr. Nazario Love  Place of Service: [facility] CARDIOLOGY [place]  Patient Name: Naina Suarez  :1945  Rosa Ybarra APRN    Chief Complaint   Patient presents with   • Slow Heart Rate     consult   • Hypertension   • Hyperlipidemia     History of Present Illness:    I am pleased to see Mrs. Suarez in my office today as a new consultation.    As you know, patient is 76-year-old white female whose past medical history is significant for hypertension, history of Rockwell syndrome, hyperlipidemia, who is referred to me for relative bradycardia.    Patient reports that she has history of Rockwell's syndrome and she carries that diagnosis for greater than 30 years.  She has been treated with nadolol 80 mg daily.  Patient has recently not seen any cardiologist.  She was evaluated by PCP in office last week and was noted to have heart rate of 42 bpm.  She was advised to stop nadolol.  Heart rate has improved but it is now on the upper side of normal and she is tachycardic today.  She does complain of palpitation.  She denies any chest pain.  No orthopnea PND no syncope or presyncope.  No leg edema noted.    Patient does not have previous history of CAD, PCI or MI.  She does not smoke or abuse alcohol.    EKG showed normal sinus rhythm heart rate is 115.  Poor progression of R wave noted.    At this stage I would recommend that patient should proceed with echocardiogram.  I would start Toprol-XL 25 mg daily.  I suspect nadolol 80 mg was probably a significant dose as she had gone into the age of 72s.  I would try low-dose beta-blocker and monitoring of heart rate and blood pressure is recommended.        Past Medical History:   Diagnosis Date   • Anxiety    • Caregiver stress    • Degenerative joint disease (DJD) of lumbar spine    • Degenerative joint disease (DJD) of lumbar spine    • Depression    • Hyperlipidemia    • Hypertension    • Hyperthyroidism     • Insomnia    • Migraine    • Osteopenia    • Valvular heart disease          Past Surgical History:   Procedure Laterality Date   • CATARACT EXTRACTION Right    • TOE SURGERY     • WISDOM TOOTH EXTRACTION             Current Outpatient Medications:   •  atorvastatin (LIPITOR) 40 MG tablet, TAKE 1 TABLET BY MOUTH EVERY NIGHT AT BEDTIME, Disp: 90 tablet, Rfl: 0  •  Biotin 5000 MCG tablet, Take  by mouth Daily., Disp: , Rfl:   •  buPROPion XL (WELLBUTRIN XL) 150 MG 24 hr tablet, Take 150 mg by mouth Daily., Disp: , Rfl:   •  Calcium Carbonate-Vit D-Min (CALCIUM 1200 PO), Take  by mouth Daily., Disp: , Rfl:   •  Cholecalciferol (VITAMIN D3) 5000 units capsule capsule, Take 5,000 Units by mouth Daily., Disp: , Rfl:   •  Cyanocobalamin (B-12 PO), Take  by mouth Daily., Disp: , Rfl:   •  DULoxetine (CYMBALTA) 60 MG capsule, TAKE 1 CAPSULE BY MOUTH DAILY, Disp: 15 capsule, Rfl: 0  •  levothyroxine (SYNTHROID) 50 MCG tablet, Take 1 tablet by mouth Daily., Disp: 90 tablet, Rfl: 1  •  Multiple Vitamins-Minerals (WOMENS MULTIVITAMIN PO), Take  by mouth Daily., Disp: , Rfl:   •  zolpidem (AMBIEN) 5 MG tablet, Take 1 tablet by mouth At Night As Needed for Sleep., Disp: 30 tablet, Rfl: 0  •  metoprolol succinate XL (TOPROL-XL) 25 MG 24 hr tablet, Take 1 tablet by mouth Daily., Disp: 90 tablet, Rfl: 3      Social History     Socioeconomic History   • Marital status:    Tobacco Use   • Smoking status: Never Smoker   • Smokeless tobacco: Never Used   Vaping Use   • Vaping Use: Never used   Substance and Sexual Activity   • Alcohol use: Yes     Comment: Rarely   • Drug use: No   • Sexual activity: Defer         Review of Systems   Constitutional: Negative for chills and fever.   HENT: Negative for ear discharge and nosebleeds.    Eyes: Negative for discharge and redness.   Cardiovascular: Negative for chest pain, orthopnea, palpitations, paroxysmal nocturnal dyspnea and syncope.   Respiratory: Negative for cough, shortness  "of breath and wheezing.    Endocrine: Negative for heat intolerance.   Skin: Negative for rash.   Musculoskeletal: Negative for arthritis and myalgias.   Gastrointestinal: Negative for abdominal pain, melena, nausea and vomiting.   Genitourinary: Negative for dysuria and hematuria.   Neurological: Negative for dizziness, light-headedness, numbness and tremors.   Psychiatric/Behavioral: Negative for depression. The patient is not nervous/anxious.        Procedures      ECG 12 Lead    Date/Time: 12/13/2021 10:40 AM  Performed by: Nazario Love MD  Authorized by: Nazario Love MD   Previous ECG: no previous ECG available  Rhythm: sinus rhythm and sinus tachycardia    Clinical impression: normal ECG            ECG 12 Lead    (Results Pending)           Objective:    /75   Pulse 115   Ht 162.6 cm (64.02\")   Wt 65.3 kg (144 lb)   BMI 24.71 kg/m²         Constitutional:       Appearance: Well-developed.   Eyes:      General: No scleral icterus.        Right eye: No discharge.   HENT:      Head: Normocephalic and atraumatic.   Neck:      Thyroid: No thyromegaly.      Lymphadenopathy: No cervical adenopathy.   Pulmonary:      Effort: Pulmonary effort is normal. No respiratory distress.      Breath sounds: Normal breath sounds. No wheezing. No rales.   Cardiovascular:      Normal rate. Regular rhythm.      No gallop.   Abdominal:      Tenderness: There is no abdominal tenderness.   Skin:     Findings: No erythema or rash.   Neurological:      Mental Status: Alert and oriented to person, place, and time.             Assessment:       Diagnosis Plan   1. Benign essential hypertension  ECG 12 Lead    metoprolol succinate XL (TOPROL-XL) 25 MG 24 hr tablet    Adult Transthoracic Echo Complete W/ Cont if Necessary Per Protocol   2. Bradycardia, sinus  ECG 12 Lead    metoprolol succinate XL (TOPROL-XL) 25 MG 24 hr tablet    Adult Transthoracic Echo Complete W/ Cont if Necessary Per Protocol   3. Valvular heart disease  ECG " 12 Lead    metoprolol succinate XL (TOPROL-XL) 25 MG 24 hr tablet    Adult Transthoracic Echo Complete W/ Cont if Necessary Per Protocol   4. Mixed hyperlipidemia  ECG 12 Lead    metoprolol succinate XL (TOPROL-XL) 25 MG 24 hr tablet    Adult Transthoracic Echo Complete W/ Cont if Necessary Per Protocol   5. Tachycardia  metoprolol succinate XL (TOPROL-XL) 25 MG 24 hr tablet    Adult Transthoracic Echo Complete W/ Cont if Necessary Per Protocol   6. Rockwell syndrome              Plan:       MDM:    1.  Bradycardia:    Patient developed bradycardia on nadolol.  It has been stopped but heart rate is now elevated.  I would start low-dose Toprol-XL    2.  Rockwell syndrome:    Patient carries the diagnosis of Rockwell syndrome I would recommend proceed with echocardiogram.  Further recommendation after results of echocardiogram    3.  Tachycardia:    I would start Toprol-XL 25 mg daily.    4.  Hypertension:    Blood pressure is slightly elevated but she believes her blood pressures at home are stable.

## 2022-01-13 ENCOUNTER — HOSPITAL ENCOUNTER (OUTPATIENT)
Dept: CARDIOLOGY | Facility: HOSPITAL | Age: 77
Discharge: HOME | End: 2022-01-13
Admitting: INTERNAL MEDICINE

## 2022-01-13 VITALS
WEIGHT: 140 LBS | SYSTOLIC BLOOD PRESSURE: 126 MMHG | BODY MASS INDEX: 24.8 KG/M2 | HEIGHT: 63 IN | DIASTOLIC BLOOD PRESSURE: 78 MMHG

## 2022-01-13 DIAGNOSIS — R00.1 BRADYCARDIA, SINUS: ICD-10-CM

## 2022-01-13 DIAGNOSIS — I38 VALVULAR HEART DISEASE: ICD-10-CM

## 2022-01-13 DIAGNOSIS — R00.0 TACHYCARDIA: ICD-10-CM

## 2022-01-13 DIAGNOSIS — E78.2 MIXED HYPERLIPIDEMIA: ICD-10-CM

## 2022-01-13 DIAGNOSIS — I10 BENIGN ESSENTIAL HYPERTENSION: ICD-10-CM

## 2022-01-13 LAB
BH CV ECHO MEAS - ACS: 1.6 CM
BH CV ECHO MEAS - AO MAX PG (FULL): -0.58 MMHG
BH CV ECHO MEAS - AO MAX PG: 5.5 MMHG
BH CV ECHO MEAS - AO MEAN PG (FULL): -0.49 MMHG
BH CV ECHO MEAS - AO MEAN PG: 2.7 MMHG
BH CV ECHO MEAS - AO ROOT AREA (BSA CORRECTED): 1.5
BH CV ECHO MEAS - AO ROOT AREA: 5 CM^2
BH CV ECHO MEAS - AO ROOT DIAM: 2.5 CM
BH CV ECHO MEAS - AO V2 MAX: 117.2 CM/SEC
BH CV ECHO MEAS - AO V2 MEAN: 76.6 CM/SEC
BH CV ECHO MEAS - AO V2 VTI: 20.5 CM
BH CV ECHO MEAS - ASC AORTA: 2.6 CM
BH CV ECHO MEAS - AVA(I,A): 3.6 CM^2
BH CV ECHO MEAS - AVA(I,D): 3.6 CM^2
BH CV ECHO MEAS - AVA(V,A): 3 CM^2
BH CV ECHO MEAS - AVA(V,D): 3 CM^2
BH CV ECHO MEAS - BSA(HAYCOCK): 1.7 M^2
BH CV ECHO MEAS - BSA: 1.7 M^2
BH CV ECHO MEAS - BZI_BMI: 24.8 KILOGRAMS/M^2
BH CV ECHO MEAS - BZI_METRIC_HEIGHT: 160 CM
BH CV ECHO MEAS - BZI_METRIC_WEIGHT: 63.5 KG
BH CV ECHO MEAS - EDV(CUBED): 42.1 ML
BH CV ECHO MEAS - EDV(MOD-SP4): 40.6 ML
BH CV ECHO MEAS - EDV(TEICH): 50.1 ML
BH CV ECHO MEAS - EF(CUBED): 74.2 %
BH CV ECHO MEAS - EF(MOD-BP): 67 %
BH CV ECHO MEAS - EF(MOD-SP4): 58.9 %
BH CV ECHO MEAS - EF(TEICH): 67.2 %
BH CV ECHO MEAS - ESV(CUBED): 10.8 ML
BH CV ECHO MEAS - ESV(MOD-SP4): 16.7 ML
BH CV ECHO MEAS - ESV(TEICH): 16.5 ML
BH CV ECHO MEAS - FS: 36.4 %
BH CV ECHO MEAS - IVS/LVPW: 0.97
BH CV ECHO MEAS - IVSD: 0.99 CM
BH CV ECHO MEAS - LA DIMENSION(2D): 3 CM
BH CV ECHO MEAS - LV DIASTOLIC VOL/BSA (35-75): 24.4 ML/M^2
BH CV ECHO MEAS - LV MASS(C)D: 103.2 GRAMS
BH CV ECHO MEAS - LV MASS(C)DI: 62.1 GRAMS/M^2
BH CV ECHO MEAS - LV MAX PG: 6.1 MMHG
BH CV ECHO MEAS - LV MEAN PG: 3.2 MMHG
BH CV ECHO MEAS - LV SYSTOLIC VOL/BSA (12-30): 10 ML/M^2
BH CV ECHO MEAS - LV V1 MAX: 123.2 CM/SEC
BH CV ECHO MEAS - LV V1 MEAN: 83.8 CM/SEC
BH CV ECHO MEAS - LV V1 VTI: 25.5 CM
BH CV ECHO MEAS - LVIDD: 3.5 CM
BH CV ECHO MEAS - LVIDS: 2.2 CM
BH CV ECHO MEAS - LVOT AREA: 2.9 CM^2
BH CV ECHO MEAS - LVOT DIAM: 1.9 CM
BH CV ECHO MEAS - LVPWD: 1 CM
BH CV ECHO MEAS - MV A MAX VEL: 95.5 CM/SEC
BH CV ECHO MEAS - MV DEC SLOPE: 382.4 CM/SEC^2
BH CV ECHO MEAS - MV DEC TIME: 0.16 SEC
BH CV ECHO MEAS - MV E MAX VEL: 59.4 CM/SEC
BH CV ECHO MEAS - MV E/A: 0.62
BH CV ECHO MEAS - MV MAX PG: 3.5 MMHG
BH CV ECHO MEAS - MV MEAN PG: 1.3 MMHG
BH CV ECHO MEAS - MV V2 MAX: 93.3 CM/SEC
BH CV ECHO MEAS - MV V2 MEAN: 53.2 CM/SEC
BH CV ECHO MEAS - MV V2 VTI: 25.5 CM
BH CV ECHO MEAS - MVA(VTI): 2.9 CM^2
BH CV ECHO MEAS - PA ACC TIME: 0.11 SEC
BH CV ECHO MEAS - PA MAX PG (FULL): 0.53 MMHG
BH CV ECHO MEAS - PA MAX PG: 2.1 MMHG
BH CV ECHO MEAS - PA MEAN PG (FULL): 0.31 MMHG
BH CV ECHO MEAS - PA MEAN PG: 1.2 MMHG
BH CV ECHO MEAS - PA PR(ACCEL): 31.2 MMHG
BH CV ECHO MEAS - PA V2 MAX: 72.3 CM/SEC
BH CV ECHO MEAS - PA V2 MEAN: 50.6 CM/SEC
BH CV ECHO MEAS - PA V2 VTI: 14 CM
BH CV ECHO MEAS - PULM A REVS DUR: 0.1 SEC
BH CV ECHO MEAS - PULM A REVS VEL: 42 CM/SEC
BH CV ECHO MEAS - PULM DIAS VEL: 39 CM/SEC
BH CV ECHO MEAS - PULM S/D: 1.8
BH CV ECHO MEAS - PULM SYS VEL: 69.9 CM/SEC
BH CV ECHO MEAS - RAP SYSTOLE: 3 MMHG
BH CV ECHO MEAS - RV MAX PG: 1.6 MMHG
BH CV ECHO MEAS - RV MEAN PG: 0.88 MMHG
BH CV ECHO MEAS - RV V1 MAX: 62.4 CM/SEC
BH CV ECHO MEAS - RV V1 MEAN: 43.8 CM/SEC
BH CV ECHO MEAS - RV V1 VTI: 11.9 CM
BH CV ECHO MEAS - RVDD: 1.8 CM
BH CV ECHO MEAS - RVSP: 23.7 MMHG
BH CV ECHO MEAS - SI(AO): 62.2 ML/M^2
BH CV ECHO MEAS - SI(CUBED): 18.8 ML/M^2
BH CV ECHO MEAS - SI(LVOT): 44.1 ML/M^2
BH CV ECHO MEAS - SI(MOD-SP4): 14.4 ML/M^2
BH CV ECHO MEAS - SI(TEICH): 20.3 ML/M^2
BH CV ECHO MEAS - SV(AO): 103.3 ML
BH CV ECHO MEAS - SV(CUBED): 31.3 ML
BH CV ECHO MEAS - SV(LVOT): 73.3 ML
BH CV ECHO MEAS - SV(MOD-SP4): 23.9 ML
BH CV ECHO MEAS - SV(TEICH): 33.7 ML
BH CV ECHO MEAS - TR MAX VEL: 227.6 CM/SEC

## 2022-01-13 PROCEDURE — 93306 TTE W/DOPPLER COMPLETE: CPT

## 2022-01-13 PROCEDURE — 93306 TTE W/DOPPLER COMPLETE: CPT | Performed by: INTERNAL MEDICINE

## 2022-01-14 ENCOUNTER — TELEPHONE (OUTPATIENT)
Dept: CARDIOLOGY | Facility: CLINIC | Age: 77
End: 2022-01-14

## 2022-01-26 NOTE — PROGRESS NOTES
Date of Office Visit: 2022  Encounter Provider: Dr. Nazario Love  Place of Service: [facility] CARDIOLOGY [place]  Patient Name: Naina Suarez  :1945  Rosa Ybarra APRN    Chief Complaint   Patient presents with   • Hypertension     6 week follo wup   • Hyperlipidemia   • Slow Heart Rate     History of Present Illness    I am pleased to see Mrs. Suarez in my office today as a follow-up    As you know, patient is 76-year-old white female whose past medical history is significant for hypertension, history of Rockwell syndrome, hyperlipidemia, who came today for follow-up.    Patient reports that she has history of Rockwell's syndrome and she carries that diagnosis for greater than 30 years.  She has been treated with nadolol 80 mg daily.  Patient has recently not seen any cardiologist.  She was evaluated by PCP in office last week and was noted to have heart rate of 42 bpm.  She was advised to stop nadolol.  Heart rate has improved but it is now on the upper side of normal and she is tachycardic today..    Patient does not have previous history of CAD, PCI or MI.  She does not smoke or abuse alcohol.    In 2022, patient underwent echocardiogram which showed normal left ventricular size and function with EF of 60 to 65%.  No Rockwell's anomaly or mitral valve prolapse was noted on echocardiogram.  No significant valvular heart disease.    On previous visit I recommended to start Toprol-XL 25 mg daily and discontinue nadolol.  Her bradycardia has improved.  She is noted to be tachycardic after discontinuation of nadolol in the range of 115 bpm.  After initiation of Toprol-XL heart rate is in 80s.  She is feeling better.  Patient denies any chest pain or tightness or heaviness.  No orthopnea PND no syncope or presyncope.  No leg edema noted.    I am very pleased with the patient condition.  I would continue Toprol-XL at current dose.  Patient is advised to monitor the blood pressure.  I will  see the patient in 1 year.        Past Medical History:   Diagnosis Date   • Anxiety    • Caregiver stress    • Degenerative joint disease (DJD) of lumbar spine    • Degenerative joint disease (DJD) of lumbar spine    • Depression    • Hyperlipidemia    • Hypertension    • Hyperthyroidism    • Insomnia    • Migraine    • Osteopenia    • Valvular heart disease          Past Surgical History:   Procedure Laterality Date   • CATARACT EXTRACTION Right    • TOE SURGERY     • WISDOM TOOTH EXTRACTION             Current Outpatient Medications:   •  atorvastatin (LIPITOR) 40 MG tablet, TAKE 1 TABLET BY MOUTH EVERY NIGHT AT BEDTIME, Disp: 90 tablet, Rfl: 0  •  Biotin 5000 MCG tablet, Take  by mouth Daily., Disp: , Rfl:   •  buPROPion XL (WELLBUTRIN XL) 150 MG 24 hr tablet, Take 150 mg by mouth Daily., Disp: , Rfl:   •  Calcium Carbonate-Vit D-Min (CALCIUM 1200 PO), Take  by mouth Daily., Disp: , Rfl:   •  Cholecalciferol (VITAMIN D3) 5000 units capsule capsule, Take 5,000 Units by mouth Daily., Disp: , Rfl:   •  Cyanocobalamin (B-12 PO), Take  by mouth Daily., Disp: , Rfl:   •  DULoxetine (CYMBALTA) 60 MG capsule, TAKE 1 CAPSULE BY MOUTH DAILY, Disp: 15 capsule, Rfl: 0  •  levothyroxine (SYNTHROID) 50 MCG tablet, Take 1 tablet by mouth Daily., Disp: 90 tablet, Rfl: 1  •  metoprolol succinate XL (TOPROL-XL) 25 MG 24 hr tablet, Take 1 tablet by mouth Daily., Disp: 90 tablet, Rfl: 3  •  Multiple Vitamins-Minerals (WOMENS MULTIVITAMIN PO), Take  by mouth Daily., Disp: , Rfl:   •  zolpidem (AMBIEN) 5 MG tablet, Take 1 tablet by mouth At Night As Needed for Sleep., Disp: 30 tablet, Rfl: 0      Social History     Socioeconomic History   • Marital status:    Tobacco Use   • Smoking status: Never Smoker   • Smokeless tobacco: Never Used   Vaping Use   • Vaping Use: Never used   Substance and Sexual Activity   • Alcohol use: Yes     Comment: Rarely   • Drug use: No   • Sexual activity: Defer         Review of Systems  "  Constitutional: Negative for chills and fever.   HENT: Negative for ear discharge and nosebleeds.    Eyes: Negative for discharge and redness.   Cardiovascular: Negative for chest pain, orthopnea, palpitations, paroxysmal nocturnal dyspnea and syncope.   Respiratory: Positive for shortness of breath. Negative for cough and wheezing.    Endocrine: Negative for heat intolerance.   Skin: Negative for rash.   Musculoskeletal: Negative for arthritis and myalgias.   Gastrointestinal: Negative for abdominal pain, melena, nausea and vomiting.   Genitourinary: Negative for dysuria and hematuria.   Neurological: Negative for dizziness, light-headedness, numbness and tremors.   Psychiatric/Behavioral: Negative for depression. The patient is not nervous/anxious.        Procedures    Procedures    No orders to display           Objective:    /74   Pulse 82   Ht 160 cm (62.99\")   Wt 63.5 kg (140 lb)   BMI 24.81 kg/m²         Constitutional:       Appearance: Well-developed.   Eyes:      General: No scleral icterus.        Right eye: No discharge.   HENT:      Head: Normocephalic and atraumatic.   Neck:      Thyroid: No thyromegaly.      Lymphadenopathy: No cervical adenopathy.   Pulmonary:      Effort: Pulmonary effort is normal. No respiratory distress.      Breath sounds: Normal breath sounds. No wheezing. No rales.   Cardiovascular:      Normal rate. Regular rhythm.      No gallop.   Abdominal:      Tenderness: There is no abdominal tenderness.   Skin:     Findings: No erythema or rash.   Neurological:      Mental Status: Alert and oriented to person, place, and time.             Assessment:       Diagnosis Plan   1. Benign essential hypertension     2. Mixed hyperlipidemia     3. Bradycardia, sinus     4. Valvular heart disease              Plan:       MDM:    1.  Bradycardia:    After discontinuation of nadolol, bradycardia has improved.  Continue low-dose beta-blocker    2.  History of valvular heart " disease/Rockwell syndrome:    Echocardiogram is done and no significant valvular heart disease noted.  I have showed the patient.  No further cardiac work-up needed    3.  Hypertension:    Blood pressure is very well controlled.    4.  Hyperlipidemia:    Patient is on Lipitor.  Repeat lipid panel in future

## 2022-01-27 ENCOUNTER — OFFICE VISIT (OUTPATIENT)
Dept: CARDIOLOGY | Facility: CLINIC | Age: 77
End: 2022-01-27

## 2022-01-27 VITALS
WEIGHT: 140 LBS | SYSTOLIC BLOOD PRESSURE: 130 MMHG | BODY MASS INDEX: 24.8 KG/M2 | DIASTOLIC BLOOD PRESSURE: 74 MMHG | HEIGHT: 63 IN | HEART RATE: 82 BPM

## 2022-01-27 DIAGNOSIS — I38 VALVULAR HEART DISEASE: ICD-10-CM

## 2022-01-27 DIAGNOSIS — E78.2 MIXED HYPERLIPIDEMIA: ICD-10-CM

## 2022-01-27 DIAGNOSIS — R00.1 BRADYCARDIA, SINUS: ICD-10-CM

## 2022-01-27 DIAGNOSIS — I10 BENIGN ESSENTIAL HYPERTENSION: Primary | ICD-10-CM

## 2022-01-27 PROCEDURE — 99213 OFFICE O/P EST LOW 20 MIN: CPT | Performed by: INTERNAL MEDICINE

## 2022-02-25 DIAGNOSIS — R00.0 TACHYCARDIA: ICD-10-CM

## 2022-02-25 DIAGNOSIS — I10 BENIGN ESSENTIAL HYPERTENSION: ICD-10-CM

## 2022-02-25 DIAGNOSIS — E78.2 MIXED HYPERLIPIDEMIA: ICD-10-CM

## 2022-02-25 DIAGNOSIS — R00.1 BRADYCARDIA, SINUS: ICD-10-CM

## 2022-02-25 DIAGNOSIS — I38 VALVULAR HEART DISEASE: ICD-10-CM

## 2022-02-25 RX ORDER — METOPROLOL SUCCINATE 25 MG/1
25 TABLET, EXTENDED RELEASE ORAL DAILY
Qty: 90 TABLET | Refills: 1 | Status: SHIPPED | OUTPATIENT
Start: 2022-02-25 | End: 2022-07-25

## 2022-07-24 DIAGNOSIS — I38 VALVULAR HEART DISEASE: ICD-10-CM

## 2022-07-24 DIAGNOSIS — I10 BENIGN ESSENTIAL HYPERTENSION: ICD-10-CM

## 2022-07-24 DIAGNOSIS — R00.1 BRADYCARDIA, SINUS: ICD-10-CM

## 2022-07-24 DIAGNOSIS — R00.0 TACHYCARDIA: ICD-10-CM

## 2022-07-24 DIAGNOSIS — E78.2 MIXED HYPERLIPIDEMIA: ICD-10-CM

## 2022-07-25 RX ORDER — METOPROLOL SUCCINATE 25 MG/1
TABLET, EXTENDED RELEASE ORAL
Qty: 90 TABLET | Refills: 1 | Status: SHIPPED | OUTPATIENT
Start: 2022-07-25

## 2023-10-03 ENCOUNTER — TELEPHONE (OUTPATIENT)
Dept: CARDIOLOGY | Facility: CLINIC | Age: 78
End: 2023-10-03
Payer: MEDICARE

## 2023-10-03 DIAGNOSIS — I10 BENIGN ESSENTIAL HYPERTENSION: ICD-10-CM

## 2023-10-03 DIAGNOSIS — R00.1 BRADYCARDIA, SINUS: ICD-10-CM

## 2023-10-03 DIAGNOSIS — R00.0 TACHYCARDIA: ICD-10-CM

## 2023-10-03 DIAGNOSIS — E78.2 MIXED HYPERLIPIDEMIA: ICD-10-CM

## 2023-10-03 DIAGNOSIS — I38 VALVULAR HEART DISEASE: ICD-10-CM

## 2023-10-03 RX ORDER — METOPROLOL SUCCINATE 25 MG/1
25 TABLET, EXTENDED RELEASE ORAL DAILY
Qty: 90 TABLET | Refills: 1 | Status: SHIPPED | OUTPATIENT
Start: 2023-10-03

## 2023-10-03 NOTE — TELEPHONE ENCOUNTER
Caller: Naina Suarez    Relationship: Self    Best call back number: [phone]     Requested Prescriptions:   Requested Prescriptions     Pending Prescriptions Disp Refills    metoprolol succinate XL (TOPROL-XL) 25 MG 24 hr tablet 90 tablet 1     Sig: Take 1 tablet by mouth Daily.        Pharmacy where request should be sent: [facility] #[id number] [address] AT [address] [phone] [facility] [phone]      Last office visit with prescribing clinician: 1/27/2022   Last telemedicine visit with prescribing clinician: Visit date not found   Next office visit with prescribing clinician: 10/11/2023     Additional details provided by patient: PT REQUESTING 30 DAY PRESCRIPTION     Does the patient have less than a 3 day supply:  [] Yes  [x] No    Would you like a call back once the refill request has been completed: [] Yes [] No    If the office needs to give you a call back, can they leave a voicemail: [] Yes [] No    Arturo Hart Rep   10/03/23 15:35 EDT

## 2024-02-08 DIAGNOSIS — R00.0 TACHYCARDIA: ICD-10-CM

## 2024-02-08 DIAGNOSIS — R00.1 BRADYCARDIA, SINUS: ICD-10-CM

## 2024-02-08 DIAGNOSIS — I38 VALVULAR HEART DISEASE: ICD-10-CM

## 2024-02-08 DIAGNOSIS — E78.2 MIXED HYPERLIPIDEMIA: ICD-10-CM

## 2024-02-08 DIAGNOSIS — I10 BENIGN ESSENTIAL HYPERTENSION: ICD-10-CM

## 2024-02-08 RX ORDER — METOPROLOL SUCCINATE 25 MG/1
25 TABLET, EXTENDED RELEASE ORAL DAILY
Qty: 90 TABLET | Refills: 1 | Status: SHIPPED | OUTPATIENT
Start: 2024-02-08

## 2024-02-08 NOTE — TELEPHONE ENCOUNTER
Caller: Naina Suarez    Relationship: Self    Best call back number: [phone]     Requested Prescriptions:   Requested Prescriptions     Pending Prescriptions Disp Refills    metoprolol succinate XL (TOPROL-XL) 25 MG 24 hr tablet 90 tablet 1     Sig: Take 1 tablet by mouth Daily.        Pharmacy where request should be sent: [facility] #[id number] [address] AT [address] [phone] [facility] [phone]      Last office visit with prescribing clinician: Visit date not found   Last telemedicine visit with prescribing clinician: Visit date not found   Next office visit with prescribing clinician: 4/4/2024     Additional details provided by patient: PT HAD TO CHANGE APPT TO APRIL WOULD LIKE AN ADDITIONAL REFILL TO MAKE SURE SHE DOESN'T RUN OUT BEFORE THE APPT    Does the patient have less than a 3 day supply:  [] Yes  [x] No    Would you like a call back once the refill request has been completed: [] Yes [] No    If the office needs to give you a call back, can they leave a voicemail: [] Yes [] No    Arturo Bridges Rep   02/08/24 13:39 EST

## 2024-04-03 NOTE — PROGRESS NOTES
Date of Office Visit: 2024  Encounter Provider: Dr. Nazario Love  Place of Service: [facility] CARDIOLOGY [place]  Patient Name: Naina Suarez  :1945  Nika Figueroa APRN    Chief Complaint   Patient presents with    Slow Heart Rate    Hypertension    Hyperlipidemia    Follow-up     History of Present Illness    I am pleased to see Mrs. Suarez in my office today as a follow-up    As you know, patient is 78-year-old white female whose past medical history is significant for hypertension, history of Rockwell syndrome, hyperlipidemia, who came today for follow-up.    Patient reports that she has history of Rockwell's syndrome and she carries that diagnosis for greater than 30 years.  She has been treated with nadolol 80 mg daily.  Patient has recently not seen any cardiologist.  She was evaluated by PCP in office last week and was noted to have heart rate of 42 bpm.  She was advised to stop nadolol.  Heart rate has improved but it is now on the upper side of normal and she is tachycardic today..    Patient does not have previous history of CAD, PCI or MI.  She does not smoke or abuse alcohol.    In 2022, patient underwent echocardiogram which showed normal left ventricular size and function with EF of 60 to 65%.  No Rockwell's anomaly or mitral valve prolapse was noted on echocardiogram.  No significant valvular heart disease.    Patient came today for follow-up.  Patient is overall doing well.  Patient denies any symptom of chest pain or tightness or heaviness.  However she does complain of shortness of breath on exertion.  Patient complain of backache.  No palpitation.  No syncope or presyncope.    EKG showed normal sinus rhythm.  Isolated PACs noted.    At this stage, blood pressure was slightly high but all numbers at home are within desirable range I suspect it is whitecoat hypertension.  Continue Toprol-XL.  Refill is given.  Patient has not had ischemic evaluation.  I would recommend  that patient should proceed with stress test with Cardiolite imaging.  Patient would be limited in his exercise capacity because of back pain.  Lexiscan protocol will be used      Past Medical History:   Diagnosis Date    Anxiety     Caregiver stress     Degenerative joint disease (DJD) of lumbar spine     Degenerative joint disease (DJD) of lumbar spine     Depression     Hyperlipidemia     Hypertension     Hyperthyroidism     Insomnia     Migraine     Osteopenia     Valvular heart disease          Past Surgical History:   Procedure Laterality Date    CATARACT EXTRACTION Right     TOE SURGERY      WISDOM TOOTH EXTRACTION             Current Outpatient Medications:     atorvastatin (LIPITOR) 40 MG tablet, TAKE 1 TABLET BY MOUTH EVERY NIGHT AT BEDTIME, Disp: 90 tablet, Rfl: 0    Biotin 5000 MCG tablet, Take  by mouth Daily., Disp: , Rfl:     Calcium Carbonate-Vit D-Min (CALCIUM 1200 PO), Take  by mouth Daily., Disp: , Rfl:     Cholecalciferol (VITAMIN D3) 5000 units capsule capsule, Take 1 capsule by mouth Daily., Disp: , Rfl:     Cyanocobalamin (B-12 PO), Take  by mouth Daily., Disp: , Rfl:     DULoxetine (CYMBALTA) 60 MG capsule, TAKE 1 CAPSULE BY MOUTH DAILY, Disp: 15 capsule, Rfl: 0    levothyroxine (SYNTHROID) 50 MCG tablet, Take 1 tablet by mouth Daily., Disp: 90 tablet, Rfl: 1    metoprolol succinate XL (TOPROL-XL) 25 MG 24 hr tablet, Take 1 tablet by mouth Daily., Disp: 90 tablet, Rfl: 1    Multiple Vitamins-Minerals (WOMENS MULTIVITAMIN PO), Take  by mouth Daily., Disp: , Rfl:     zolpidem (AMBIEN) 5 MG tablet, Take 1 tablet by mouth At Night As Needed for Sleep., Disp: 30 tablet, Rfl: 0      Social History     Socioeconomic History    Marital status:    Tobacco Use    Smoking status: Never    Smokeless tobacco: Never   Vaping Use    Vaping status: Never Used   Substance and Sexual Activity    Alcohol use: Yes     Comment: Rarely    Drug use: No    Sexual activity: Defer         Review of Systems  "  Constitutional: Negative for chills and fever.   HENT:  Negative for ear discharge and nosebleeds.    Eyes:  Negative for discharge and redness.   Cardiovascular:  Negative for chest pain, orthopnea, palpitations, paroxysmal nocturnal dyspnea and syncope.   Respiratory:  Negative for cough, shortness of breath and wheezing.    Endocrine: Negative for heat intolerance.   Skin:  Negative for rash.   Musculoskeletal:  Negative for arthritis and myalgias.   Gastrointestinal:  Negative for abdominal pain, melena, nausea and vomiting.   Genitourinary:  Negative for dysuria and hematuria.   Neurological:  Negative for dizziness, light-headedness, numbness and tremors.   Psychiatric/Behavioral:  Negative for depression. The patient is not nervous/anxious.        Procedures      ECG 12 Lead    Date/Time: 4/4/2024 12:43 PM  Performed by: Nazario Love MD    Authorized by: Nazario Love MD  Comparison: compared with previous ECG   Similar to previous ECG  Rhythm: sinus rhythm  Ectopy: atrial premature contractions    Clinical impression: normal ECG          ECG 12 Lead    (Results Pending)           Objective:    /80   Pulse 74   Resp 16   Ht 160 cm (62.99\")   Wt 67.1 kg (148 lb)   SpO2 98%   BMI 26.22 kg/m²         Constitutional:       Appearance: Well-developed.   Eyes:      General: No scleral icterus.        Right eye: No discharge.   HENT:      Head: Normocephalic and atraumatic.   Neck:      Thyroid: No thyromegaly.      Lymphadenopathy: No cervical adenopathy.   Pulmonary:      Effort: Pulmonary effort is normal. No respiratory distress.      Breath sounds: Normal breath sounds. No wheezing. No rales.   Cardiovascular:      Normal rate. Regular rhythm.      No gallop.    Edema:     Peripheral edema absent.   Abdominal:      Tenderness: There is no abdominal tenderness.   Skin:     Findings: No erythema or rash.   Neurological:      Mental Status: Alert and oriented to person, place, and time.           "   Assessment:       Diagnosis Plan   1. Benign essential hypertension  ECG 12 Lead    Stress Test With Myocardial Perfusion One Day    metoprolol succinate XL (TOPROL-XL) 25 MG 24 hr tablet      2. Bradycardia, sinus  ECG 12 Lead    Stress Test With Myocardial Perfusion One Day    metoprolol succinate XL (TOPROL-XL) 25 MG 24 hr tablet      3. Mixed hyperlipidemia  ECG 12 Lead    Stress Test With Myocardial Perfusion One Day    metoprolol succinate XL (TOPROL-XL) 25 MG 24 hr tablet      4. Shortness of breath  Stress Test With Myocardial Perfusion One Day      5. Tachycardia  metoprolol succinate XL (TOPROL-XL) 25 MG 24 hr tablet      6. Valvular heart disease  metoprolol succinate XL (TOPROL-XL) 25 MG 24 hr tablet               Plan:       MDM:    1.  Hypertension:    Blood pressure is slightly high but most of the blood pressures are within desirable range I would recommend observation    2.  Sinus bradycardia:    Patient heart rate is controlled without nadolol.  Patient tachycardia has improved with beta-blocker recommend observation    3.  Hyperlipidemia:    Patient is on Lipitor repeat lipid panel testing    4.  Shortness of breath:    Proceed with stress test with Cardiolite imaging

## 2024-04-04 ENCOUNTER — OFFICE VISIT (OUTPATIENT)
Dept: CARDIOLOGY | Facility: CLINIC | Age: 79
End: 2024-04-04
Payer: MEDICARE

## 2024-04-04 VITALS
WEIGHT: 148 LBS | DIASTOLIC BLOOD PRESSURE: 80 MMHG | OXYGEN SATURATION: 98 % | SYSTOLIC BLOOD PRESSURE: 139 MMHG | RESPIRATION RATE: 16 BRPM | HEIGHT: 63 IN | BODY MASS INDEX: 26.22 KG/M2 | HEART RATE: 74 BPM

## 2024-04-04 DIAGNOSIS — E78.2 MIXED HYPERLIPIDEMIA: ICD-10-CM

## 2024-04-04 DIAGNOSIS — I10 BENIGN ESSENTIAL HYPERTENSION: Primary | ICD-10-CM

## 2024-04-04 DIAGNOSIS — R00.0 TACHYCARDIA: ICD-10-CM

## 2024-04-04 DIAGNOSIS — R06.02 SHORTNESS OF BREATH: ICD-10-CM

## 2024-04-04 DIAGNOSIS — R00.1 BRADYCARDIA, SINUS: ICD-10-CM

## 2024-04-04 DIAGNOSIS — I38 VALVULAR HEART DISEASE: ICD-10-CM

## 2024-04-04 PROCEDURE — 1160F RVW MEDS BY RX/DR IN RCRD: CPT | Performed by: INTERNAL MEDICINE

## 2024-04-04 PROCEDURE — 93000 ELECTROCARDIOGRAM COMPLETE: CPT | Performed by: INTERNAL MEDICINE

## 2024-04-04 PROCEDURE — 99214 OFFICE O/P EST MOD 30 MIN: CPT | Performed by: INTERNAL MEDICINE

## 2024-04-04 PROCEDURE — 3079F DIAST BP 80-89 MM HG: CPT | Performed by: INTERNAL MEDICINE

## 2024-04-04 PROCEDURE — 3075F SYST BP GE 130 - 139MM HG: CPT | Performed by: INTERNAL MEDICINE

## 2024-04-04 PROCEDURE — 1159F MED LIST DOCD IN RCRD: CPT | Performed by: INTERNAL MEDICINE

## 2024-04-04 RX ORDER — METOPROLOL SUCCINATE 25 MG/1
25 TABLET, EXTENDED RELEASE ORAL DAILY
Qty: 90 TABLET | Refills: 1 | Status: SHIPPED | OUTPATIENT
Start: 2024-04-04

## 2024-06-25 ENCOUNTER — TELEPHONE (OUTPATIENT)
Dept: CARDIOLOGY | Facility: CLINIC | Age: 79
End: 2024-06-25

## 2024-06-25 NOTE — TELEPHONE ENCOUNTER
Caller: Naina Suarez    Relationship to patient: Self    Best call back number:  [phone]    Patient is needing: PATIENT CHANGING PHARMACY TO [facility] [address] [facility] [place]

## 2024-09-30 DIAGNOSIS — E78.2 MIXED HYPERLIPIDEMIA: ICD-10-CM

## 2024-09-30 DIAGNOSIS — I10 BENIGN ESSENTIAL HYPERTENSION: ICD-10-CM

## 2024-09-30 DIAGNOSIS — I38 VALVULAR HEART DISEASE: ICD-10-CM

## 2024-09-30 DIAGNOSIS — R00.1 BRADYCARDIA, SINUS: ICD-10-CM

## 2024-09-30 DIAGNOSIS — R00.0 TACHYCARDIA: ICD-10-CM

## 2024-09-30 RX ORDER — METOPROLOL SUCCINATE 25 MG/1
25 TABLET, EXTENDED RELEASE ORAL DAILY
Qty: 90 TABLET | Refills: 1 | Status: SHIPPED | OUTPATIENT
Start: 2024-09-30

## 2024-09-30 NOTE — TELEPHONE ENCOUNTER
Caller: Naina Suarez    Relationship: Self    Best call back number: [phone]    Requested Prescriptions:   Requested Prescriptions     Pending Prescriptions Disp Refills    metoprolol succinate XL (TOPROL-XL) 25 MG 24 hr tablet 90 tablet 1     Sig: Take 1 tablet by mouth Daily.        Pharmacy where request should be sent: [facility] #[id number] [address] AT [address] [phone] [facility] [phone]      Last office visit with prescribing clinician: 4/4/2024   Last telemedicine visit with prescribing clinician: Visit date not found   Next office visit with prescribing clinician: 4/3/2025     Additional details provided by patient: PATIENT HAS 4 DAYS OF MEDICATION.    Does the patient have less than a 3 day supply:  [] Yes  [x] No    Would you like a call back once the refill request has been completed: [] Yes [] No    If the office needs to give you a call back, can they leave a voicemail: [] Yes [] No    Arturo Bender Rep   09/30/24 15:29 EDT

## 2025-04-03 DIAGNOSIS — R00.1 BRADYCARDIA, SINUS: ICD-10-CM

## 2025-04-03 DIAGNOSIS — I10 BENIGN ESSENTIAL HYPERTENSION: ICD-10-CM

## 2025-04-03 DIAGNOSIS — I38 VALVULAR HEART DISEASE: ICD-10-CM

## 2025-04-03 DIAGNOSIS — E78.2 MIXED HYPERLIPIDEMIA: ICD-10-CM

## 2025-04-03 DIAGNOSIS — R00.0 TACHYCARDIA: ICD-10-CM

## 2025-04-03 RX ORDER — METOPROLOL SUCCINATE 25 MG/1
25 TABLET, EXTENDED RELEASE ORAL DAILY
Qty: 90 TABLET | Refills: 1 | Status: SHIPPED | OUTPATIENT
Start: 2025-04-03